# Patient Record
Sex: MALE | Race: WHITE | NOT HISPANIC OR LATINO | Employment: OTHER | URBAN - METROPOLITAN AREA
[De-identification: names, ages, dates, MRNs, and addresses within clinical notes are randomized per-mention and may not be internally consistent; named-entity substitution may affect disease eponyms.]

---

## 2017-04-26 ASSESSMENT — ENCOUNTER SYMPTOMS
SINUS PAIN: 1
SORE THROAT: 1
TROUBLE SWALLOWING: 0
SINUS CONGESTION: 1
NECK MASS: 0
SMELL DISTURBANCE: 0
HOARSE VOICE: 0
TASTE DISTURBANCE: 0

## 2017-05-05 ENCOUNTER — PRE VISIT (OUTPATIENT)
Dept: ENDOCRINOLOGY | Facility: CLINIC | Age: 37
End: 2017-05-05

## 2017-05-05 NOTE — TELEPHONE ENCOUNTER
Chief Complaint   Patient presents with     Pre Visit Planning - Unable To Reach     Not able to reach patient by phone, sent [employer] message in hopes to obtain medical records prior to appt. Based on phone number of patient, he [place].

## 2017-05-08 ENCOUNTER — PRE VISIT (OUTPATIENT)
Dept: ENDOCRINOLOGY | Facility: CLINIC | Age: 37
End: 2017-05-08

## 2017-05-08 NOTE — TELEPHONE ENCOUNTER
Patient confirmed via [employer] that medical records are from [facility] in [place] and they have been opened [facility]

## 2017-05-10 ENCOUNTER — OFFICE VISIT (OUTPATIENT)
Dept: ENDOCRINOLOGY | Facility: CLINIC | Age: 37
End: 2017-05-10

## 2017-05-10 VITALS
WEIGHT: 207.5 LBS | HEIGHT: 74 IN | HEART RATE: 80 BPM | BODY MASS INDEX: 26.63 KG/M2 | SYSTOLIC BLOOD PRESSURE: 140 MMHG | DIASTOLIC BLOOD PRESSURE: 84 MMHG

## 2017-05-10 DIAGNOSIS — Z91.89 COMPLIANCE WITH MEDICATION REGIMEN: ICD-10-CM

## 2017-05-10 DIAGNOSIS — E10.9 TYPE 1 DIABETES MELLITUS WITHOUT COMPLICATION (H): Primary | ICD-10-CM

## 2017-05-10 LAB — HBA1C MFR BLD: 7.9 % (ref 4.3–6)

## 2017-05-10 RX ORDER — LANCETS 30 GAUGE
EACH MISCELLANEOUS
COMMUNITY
Start: 2017-03-06

## 2017-05-10 RX ORDER — ATORVASTATIN CALCIUM 10 MG/1
10 TABLET, FILM COATED ORAL DAILY
COMMUNITY
Start: 2017-03-08 | End: 2018-03-20

## 2017-05-10 RX ORDER — GLUCOSAMINE HCL/CHONDROITIN SU 500-400 MG
CAPSULE ORAL
COMMUNITY
Start: 2017-03-22

## 2017-05-10 ASSESSMENT — PAIN SCALES - GENERAL: PAINLEVEL: NO PAIN (0)

## 2017-05-10 NOTE — LETTER
5/10/2017       RE: Kyle Bermeo  [address] [place] [address]     Dear Colleague,    Thank you for referring your patient, Kyle Bermeo, to the [facility] ENDOCRINOLOGY at [facility]. Please see a copy of my visit note below.                             - Endocrinology Initial Consultation -    Reason for visit/consult:  DM1 on insulin pump    Primary care provider: Confirmed, No PCP    HPI: 36 yo male here for the initial visit for his DM1. He moved from [place] to [place] 10/2016, now starts to establish care in [place]. He was seen at [facility] [place] [facility], Dr. Mario, last seen in 9/2016.  He has DM1 since age 8, at that time he had polyuria, glucose was 750 during the baseball game.   Insulin pump ([employer]tron[employer]) was started 15 years ago.    His A1C is 7.9 today, he mentioned used to be usually A1C around 7, but recently was elevated to scar up to 7.0s -8.0s..     His concern is about insulin injection sites. He states that he had lots scar around his abdomen, and he noted that tube needle above the scar affects his glucose control. For example, above the scar, it takes 3 hours for insulin start to work, therefore sometimes he had to bolus a few times in a row, which eventually drop his glucose.   For the past several month, he tried back, buttock, and thigh. He said thigh did not work well.   Insulin pump setting has not changed recently. Length of needl 9 mm, no recent episode of tube occulusion.    Hypoglycemia: He noted the patterns due to over correction.    Currently also on CGM, not checking glucose by glucometer except calibration.     Current regimnes:   Carb ratio: 1:8 (all meals)  Sentivity 30  Active insulin tme: 4 hour    Basal  0:00    1.85  3:00    1.55  8:00    2.00  14:00  1.75  19:00  2.00  22:30  1.80    Life style:  Wake up 6am  Breakfast: skip, coffee only  Lunch: sandwich (70-90g)  No much snacks  Dinner: (70-90g carb)      DM  "complications:  Retinopathy: 7/2016 early retinopathy  Nephropathy: NEGATIVE (8/2016)  Neuropathy: no  Most recent LDL: June/2016 59 (care everywhere) (on lipitor)    Past Medical/Surgical History:  Past Medical History:   Diagnosis Date     Diabetes mellitus type 1 (H)     since age 8, medtronics pump,      No past surgical history on file.    Allergies:  No Known Allergies    Current Medications   Current Outpatient Prescriptions   Medication     insulin aspart (NOVOLOG PEN) 100 UNIT/ML injection     atorvastatin (LIPITOR) 10 MG tablet     insulin aspart (NOVOLOG VIAL) 100 UNITS/ML injection     Glucose Blood (BLOOD GLUCOSE TEST STRIPS) STRP     Lancets MISC     Multiple Vitamins-Minerals (MULTIVITAMIN ADULT PO)     VITAMIN D, CHOLECALCIFEROL, PO     No current facility-administered medications for this visit.        Family History:  No family history on file.  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      ROS:  Full review of systems taken with the help of the intake sheet. Otherwise a complete 14 point review of systems was taken and is negative unless stated in the history above.      Physical Exam:   Blood pressure 140/84, pulse 80, height 1.88 m (6' 2\"), weight 94.1 kg (207 lb 8 oz).  General: well appearing, no acute distress, pleasant and conversant,   Mental Status/neuro: alert and oriented  Face: symmetrical, normal facial color  Eyes: anicteric, PERRL, no proptosis or lid lag  Neck: suppler, no lymphadenopahty  Thyroid: normal size and texture, no nodule palpable, no bruits  Heart: regular rhythm, S1S2, no murmur appreciated  Lung: clear to auscultation bilaterally  Abdomen: soft, NT/ND, hard 5-6 cm multiple tissues palpable by deep palpation, around " umbilicus to the side  Back: no palpable scar tissues  Legs: no swelling or edema  Feet: no deformities or ulcers, 2+ DP pulses, normal monofilament sensation      Labs : I accessed care everywhere and reviewed out side record. Summarizing most recent lab here.     TSH 0.86 (8/2016)  (8/2016) Na 138, K 4.3, Ca 9.8, BUN 61, Cre 1.04, TP 7.3, AST 33, ALT 32, ALP 59,       Glucose Log: We downloaded from pump program and summarize pump use here:    Pump use 62.7 unit ave /day  Basal/Bolus ratio 67/33  Manual bolus 18 u (5.6 bolus)  Bolus wizard 2.9 u (0.8 bolus)  Food 0 units  Site change 4.7 days ave        Assessment and Plan  37 year old male with DM1 on insulin pump,  # Ann Marie's input appreciated, we noted following,   1. basal/bolus balance is ideal,   2. not doing carb ration based bolus, instead multiple manual bolus,   3. abdominal scar tisseus    -Refer to Ann Marie and Mahnaz in 1 month  - Conitnue current basal,  - Encouraged to do 1:8 meal bolus, that he is supposed to do  - Insulin pump injection site OK to continue on his back  - RTC with me in 3 month      I spent 45 minutes with this patient face to face and explained the conditions and plans (more than 50% of time was counseling/coordination of care, discussed about injection sites, discussed about bolus) . The patient understood and is satisfied with today's visit. Return to clinic with me in 3 months.         Lilia Mendoza MD  Staff Physician  Endocrinology and Metabolism  License: [id number]    Again, thank you for allowing me to participate in the care of your patient.      Sincerely,    Lilia Mendoza MD

## 2017-05-10 NOTE — PROGRESS NOTES
- Endocrinology Initial Consultation -    Reason for visit/consult:  DM1 on insulin pump    Primary care provider: Confirmed, No PCP    HPI: 36 yo male here for the initial visit for his DM1. He moved from [place] to [place] 10/2016, now starts to establish care in [place]. He was seen at [facility], Dr. Mario, last seen in 9/2016.  He has DM1 since age 8, at that time he had polyuria, glucose was 750 during the baseball game.   Insulin pump ([employer]tron[employer]) was started 15 years ago.    His A1C is 7.9 today, he mentioned used to be usually A1C around 7, but recently was elevated to scar up to 7.0s -8.0s..     His concern is about insulin injection sites. He states that he had lots scar around his abdomen, and he noted that tube needle above the scar affects his glucose control. For example, above the scar, it takes 3 hours for insulin start to work, therefore sometimes he had to bolus a few times in a row, which eventually drop his glucose.   For the past several month, he tried back, buttock, and thigh. He said thigh did not work well.   Insulin pump setting has not changed recently. Length of needl 9 mm, no recent episode of tube occulusion.    Hypoglycemia: He noted the patterns due to over correction.    Currently also on CGM, not checking glucose by glucometer except calibration.     Current regimnes:   Carb ratio: 1:8 (all meals)  Sentivity 30  Active insulin tme: 4 hour    Basal  0:00    1.85  3:00    1.55  8:00    2.00  14:00  1.75  19:00  2.00  22:30  1.80    Life style:  Wake up 6am  Breakfast: skip, coffee only  Lunch: sandwich (70-90g)  No much snacks  Dinner: (70-90g carb)      DM complications:  Retinopathy: 7/2016 early retinopathy  Nephropathy: NEGATIVE (8/2016)  Neuropathy: no  Most recent LDL: June/2016 59 (care everywhere) (on lipitor)    Past Medical/Surgical History:  Past Medical History:   Diagnosis Date     Diabetes mellitus type 1 (H)     since age 8,  "medtron[employer] pump,      No past surgical history on file.    Allergies:  No Known Allergies    Current Medications   Current Outpatient Prescriptions   Medication     insulin aspart (NOVOLOG PEN) 100 UNIT/ML injection     atorvastatin (LIPITOR) 10 MG tablet     insulin aspart (NOVOLOG VIAL) 100 UNITS/ML injection     Glucose Blood (BLOOD GLUCOSE TEST STRIPS) STRP     Lancets MISC     Multiple Vitamins-Minerals (MULTIVITAMIN ADULT PO)     VITAMIN D, CHOLECALCIFEROL, PO     No current facility-administered medications for this visit.        Family History:  No family history on file.  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      ROS:  Full review of systems taken with the help of the intake sheet. Otherwise a complete 14 point review of systems was taken and is negative unless stated in the history above.      Physical Exam:   Blood pressure 140/84, pulse 80, height 1.88 m (6' 2\"), weight 94.1 kg (207 lb 8 oz).  General: well appearing, no acute distress, pleasant and conversant,   Mental Status/neuro: alert and oriented  Face: symmetrical, normal facial color  Eyes: anicteric, PERRL, no proptosis or lid lag  Neck: suppler, no lymphadenopahty  Thyroid: normal size and texture, no nodule palpable, no bruits  Heart: regular rhythm, S1S2, no murmur appreciated  Lung: clear to auscultation bilaterally  Abdomen: soft, NT/ND, hard 5-6 cm multiple tissues palpable by deep palpation, around umbilicus to the side  Back: no palpable scar tissues  Legs: no swelling or edema  Feet: no deformities or ulcers, 2+ DP pulses, normal monofilament sensation      Labs : I accessed care everywhere and reviewed out side record. Summarizing most recent lab here.     TSH 0.86 (8/2016)  (8/2016) " Na 138, K 4.3, Ca 9.8, BUN 61, Cre 1.04, TP 7.3, AST 33, ALT 32, ALP 59,       Glucose Log: We downloaded from pump program and summarize pump use here:    Pump use 62.7 unit ave /day  Basal/Bolus ratio 67/33  Manual bolus 18 u (5.6 bolus)  Bolus wizard 2.9 u (0.8 bolus)  Food 0 units  Site change 4.7 days ave        Assessment and Plan  37 year old male with DM1 on insulin pump,  # Ann Marie's input appreciated, we noted following,   1. basal/bolus balance is ideal,   2. not doing carb ration based bolus, instead multiple manual bolus,   3. abdominal scar tisseus    -Refer to Ann Marie and Mahnaz in 1 month  - Conitnue current basal,  - Encouraged to do 1:8 meal bolus, that he is supposed to do  - Insulin pump injection site OK to continue on his back  - RTC with me in 3 month      I spent 45 minutes with this patient face to face and explained the conditions and plans (more than 50% of time was counseling/coordination of care, discussed about injection sites, discussed about bolus) . The patient understood and is satisfied with today's visit. Return to clinic with me in 3 months.         Lilia Mendoza MD  Staff Physician  Endocrinology and Metabolism  License: [id number]

## 2017-05-10 NOTE — NURSING NOTE
"Performed A1C test - patient tolerated well.    Jacque Liang, SABIRNA       Chief Complaint   Patient presents with     Consult     DIABETES TYPE 1 CONSULTATION        Initial /84 (BP Location: Right arm, Patient Position: Chair, Cuff Size: Adult Regular)  Pulse 80  Ht 1.88 m (6' 2\")  Wt 94.1 kg (207 lb 8 oz)  BMI 26.64 kg/m2 Estimated body mass index is 26.64 kg/(m^2) as calculated from the following:    Height as of this encounter: 1.88 m (6' 2\").    Weight as of this encounter: 94.1 kg (207 lb 8 oz).  Medication Reconciliation: complete    "

## 2017-05-10 NOTE — LETTER
Date:May 11, 2017      Patient was self referred, no letter generated. Do not send.        [facility] Health Information

## 2017-05-10 NOTE — MR AVS SNAPSHOT
After Visit Summary   5/10/2017    Kyle Bermeo    MRN: [id number]           Patient Information     Date Of Birth          1980        Visit Information        Provider Department      5/10/2017 9:30 AM Lilia Mendoza MD  [facility]        Today's Diagnoses     Type 1 diabetes mellitus without complication (H)    -  1       Follow-ups after your visit        Additional Services     DIABETES EDUCATOR REFERRAL       Please refer to Annmarie    38 yo male with DM1, insulin pupm                  Your next 10 appointments already scheduled     Jul 26, 2017  8:30 AM CDT   (Arrive by 8:15 AM)   Office Visit with Mahnaz Rowland RD   [facility] Diabetes ([facility])    [address] [place] [id number] [phone]           Bring a current list of meds and any records pertaining to this visit.  For Physicals, please bring immunization records and any forms needing to be filled out.  Please arrive 10 minutes early to complete paperwork.            Jul 26, 2017  9:30 AM CDT   (Arrive by 9:15 AM)   Office Visit with Ann Marie Godoy RN   [facility] ([facility])    [address] [place] [address] [phone]           Bring a current list of meds and any records pertaining to this visit.  For Physicals, please bring immunization records and any forms needing to be filled out.  Please arrive 10 minutes early to complete paperwork.            Aug 22, 2017  3:30 PM CDT   (Arrive by 3:15 PM)   RETURN DIABETES with Lilia Mendoza MD   [facility] Endocrinology ([facility])    [address] [place] [address] [phone]              Who to contact     Please call your clinic at [phone] to:    Ask questions about your health    Make or cancel appointments    Discuss your medicines    Learn about your test results    Speak to your  "doctor   If you have compliments or concerns about an experience at your clinic, or if you wish to file a complaint, please contact [facility] Patient Relations at [phone] or email us at [email or web address]         Additional Information About Your Visit        MyChart Information     [employer] gives you secure access to your electronic health record. If you see a primary care provider, you can also send messages to your care team and make appointments. If you have questions, please call your primary care clinic.  If you do not have a primary care provider, please call [phone] and they will assist you.      [employer] is an electronic gateway that provides easy, online access to your medical records. With [employer], you can request a clinic appointment, read your test results, renew a prescription or communicate with your care team.     To access your existing account, please contact your [facility] or call [phone] for assistance.        Care EveryWhere ID     This is your Care EveryWhere ID. This could be used by other organizations to access your [facility] medical records  [id number]        Your Vitals Were     Pulse Height BMI (Body Mass Index)             80 1.88 m (6' 2\") 26.64 kg/m2          Blood Pressure from Last 3 Encounters:   05/10/17 140/84    Weight from Last 3 Encounters:   05/10/17 94.1 kg (207 lb 8 oz)              We Performed the Following     DIABETES EDUCATOR REFERRAL        Primary Care Provider    No Pcp Confirmed       No address on file        Thank you!     Thank you for choosing [facility] ENDOCRINOLOGY  for your care. Our goal is always to provide you with excellent care. Hearing back from our patients is one way we can continue to improve our services. Please take a few minutes to complete the written survey that you may receive in the mail after your visit with us. Thank you!             Your Updated Medication " List - Protect others around you: Learn how to safely use, store and throw away your medicines at www.disposemymeds.org.          This list is accurate as of: 5/10/17 10:59 AM.  Always use your most recent med list.                   Brand Name Dispense Instructions for use    atorvastatin 10 MG tablet    LIPITOR     10 mg daily       BLOOD GLUCOSE TEST STRIPS Strp      10 times daily       * insulin aspart 100 UNIT/ML injection    NovoLOG PEN         * NovoLOG VIAL 100 UNITS/ML injection   Generic drug:  insulin aspart      100 Units       Lancets Misc      checks 10 times daily       MULTIVITAMIN ADULT PO          VITAMIN D (CHOLECALCIFEROL) PO      Take by mouth as needed TAKES IN WINTER MONTHS       * Notice:  This list has 2 medication(s) that are the same as other medications prescribed for you. Read the directions carefully, and ask your doctor or other care provider to review them with you.

## 2017-10-11 ENCOUNTER — OFFICE VISIT (OUTPATIENT)
Dept: EDUCATION SERVICES | Facility: CLINIC | Age: 37
End: 2017-10-11

## 2017-10-11 DIAGNOSIS — E10.9 DIABETES MELLITUS TYPE 1 (H): Primary | ICD-10-CM

## 2017-10-11 NOTE — MR AVS SNAPSHOT
After Visit Summary   10/11/2017    Kyle Bermeo    MRN: [id number]           Patient Information     Date Of Birth          1980        Visit Information        Provider Department      10/11/2017 10:30 AM Mahnaz Rowland RD M [facility] Diabetes        Today's Diagnoses     Diabetes mellitus type 1 (H)    -  1       Follow-ups after your visit        Your next 10 appointments already scheduled     Nov 06, 2017  8:00 AM CST   (Arrive by 7:45 AM)   RETURN DIABETES with MD YASMIN Feliz [facility] Endocrinology ([facility])    [address] [place] [address] [phone]              Who to contact     Please call your clinic at [phone] to:    Ask questions about your health    Make or cancel appointments    Discuss your medicines    Learn about your test results    Speak to your doctor   If you have compliments or concerns about an experience at your clinic, or if you wish to file a complaint, please contact [facility] Patient Relations at [phone] or email us at [email or web address]         Additional Information About Your Visit        MyChart Information     [employer] gives you secure access to your electronic health record. If you see a primary care provider, you can also send messages to your care team and make appointments. If you have questions, please call your primary care clinic.  If you do not have a primary care provider, please call [phone] and they will assist you.      [employer] is an electronic gateway that provides easy, online access to your medical records. With [employer], you can request a clinic appointment, read your test results, renew a prescription or communicate with your care team.     To access your existing account, please contact your [facility] or call [phone] for assistance.        Care EveryWhere ID     This is your Care EveryWhere  ID. This could be used by other organizations to access your [facility] medical records  [id number]         Blood Pressure from Last 3 Encounters:   05/10/17 140/84    Weight from Last 3 Encounters:   05/10/17 94.1 kg (207 lb 8 oz)              We Performed the Following     DIABETES EDUCATION - Individual  []        Primary Care Provider    None Specified       No primary provider on file.        Equal Access to Services     [facility]: Hadii aad ku hadasho Soomaali, waaxda luqadaha, qaybta kaalmada adeegyada, waxay alannahin hayaan adeeg taylor labriann . So [facility] [phone].    ATENCIÓN: Si habla español, tiene a conroy disposición servicios gratuitos de asistencia lingüística. Llame al [phone].    We comply with applicable federal civil rights laws and [place] laws. We do not discriminate on the basis of race, color, national origin, age, disability, sex, sexual orientation, or gender identity.            Thank you!     Thank you for choosing [facility] DIABETES  for your care. Our goal is always to provide you with excellent care. Hearing back from our patients is one way we can continue to improve our services. Please take a few minutes to complete the written survey that you may receive in the mail after your visit with us. Thank you!             Your Updated Medication List - Protect others around you: Learn how to safely use, store and throw away your medicines at www.disposemymeds.org.          This list is accurate as of: 10/11/17 11:58 AM.  Always use your most recent med list.                   Brand Name Dispense Instructions for use Diagnosis    atorvastatin 10 MG tablet    LIPITOR     10 mg daily    Type 1 diabetes mellitus without complication (H)       BLOOD GLUCOSE TEST STRIPS Strp      10 times daily    Type 1 diabetes mellitus without complication (H)       * insulin aspart 100 UNIT/ML injection    NovoLOG PEN      Type 1 diabetes mellitus without complication (H)       * NovoLOG VIAL 100  UNITS/ML injection   Generic drug:  insulin aspart      100 Units    Type 1 diabetes mellitus without complication (H)       Lancets Misc      checks 10 times daily    Type 1 diabetes mellitus without complication (H)       MULTIVITAMIN ADULT PO       Type 1 diabetes mellitus without complication (H)       VITAMIN D (CHOLECALCIFEROL) PO      Take by mouth as needed TAKES IN WINTER MONTHS    Type 1 diabetes mellitus without complication (H)       * Notice:  This list has 2 medication(s) that are the same as other medications prescribed for you. Read the directions carefully, and ask your doctor or other care provider to review them with you.

## 2017-10-11 NOTE — MR AVS SNAPSHOT
After Visit Summary   10/11/2017    Kyle Bermeo    MRN: [id number]           Patient Information     Date Of Birth          1980        Visit Information        Provider Department      10/11/2017 9:30 AM Ann Marie Godoy RN M [facility] Diabetes        Today's Diagnoses     Diabetes mellitus type 1 (H)    -  1       Follow-ups after your visit        Your next 10 appointments already scheduled     Nov 06, 2017  8:00 AM CST   (Arrive by 7:45 AM)   RETURN DIABETES with MD YASMIN Feliz [facility] Endocrinology ([facility])    [address] [place] [id number] [phone]              Who to contact     Please call your clinic at [phone] to:    Ask questions about your health    Make or cancel appointments    Discuss your medicines    Learn about your test results    Speak to your doctor   If you have compliments or concerns about an experience at your clinic, or if you wish to file a complaint, please contact [facility] Patient Relations at [phone] or email us at [email or web address]         Additional Information About Your Visit        MyChart Information     [employer] gives you secure access to your electronic health record. If you see a primary care provider, you can also send messages to your care team and make appointments. If you have questions, please call your primary care clinic.  If you do not have a primary care provider, please call [phone] and they will assist you.      [employer] Colourjack is an electronic gateway that provides easy, online access to your medical records. With [employer], you can request a clinic appointment, read your test results, renew a prescription or communicate with your care team.     To access your existing account, please contact your [facility] or call [phone] for assistance.        Care EveryWhere ID     This is your Care EveryWhere  ID. This could be used by other organizations to access your [place] medical records  [id number]         Blood Pressure from Last 3 Encounters:   05/10/17 140/84    Weight from Last 3 Encounters:   05/10/17 94.1 kg (207 lb 8 oz)              Today, you had the following     No orders found for display       Primary Care Provider    None Specified       No primary provider on file.        Equal Access to Services     [facility]: Hadii aad ku hadasho Soomaali, waaxda luqadaha, qaybta kaalmada adeegyada, waxay alannahin hayaan adelopez sarabenji lei . So [facility] [phone].    ATENCIÓN: Si habla español, tiene a conroy disposición servicios gratuitos de asistencia lingüística. Llame al [phone].    We comply with applicable federal civil rights laws and [place] laws. We do not discriminate on the basis of race, color, national origin, age, disability, sex, sexual orientation, or gender identity.            Thank you!     Thank you for choosing [facility] DIABETES  for your care. Our goal is always to provide you with excellent care. Hearing back from our patients is one way we can continue to improve our services. Please take a few minutes to complete the written survey that you may receive in the mail after your visit with us. Thank you!             Your Updated Medication List - Protect others around you: Learn how to safely use, store and throw away your medicines at www.disposemymeds.org.          This list is accurate as of: 10/11/17 11:59 PM.  Always use your most recent med list.                   Brand Name Dispense Instructions for use Diagnosis    atorvastatin 10 MG tablet    LIPITOR     10 mg daily    Type 1 diabetes mellitus without complication (H)       BLOOD GLUCOSE TEST STRIPS Strp      10 times daily    Type 1 diabetes mellitus without complication (H)       * insulin aspart 100 UNIT/ML injection    NovoLOG PEN      Type 1 diabetes mellitus without complication (H)       * NovoLOG VIAL 100 UNITS/ML  injection   Generic drug:  insulin aspart      100 Units    Type 1 diabetes mellitus without complication (H)       Lancets Misc      checks 10 times daily    Type 1 diabetes mellitus without complication (H)       MULTIVITAMIN ADULT PO       Type 1 diabetes mellitus without complication (H)       VITAMIN D (CHOLECALCIFEROL) PO      Take by mouth as needed TAKES IN WINTER MONTHS    Type 1 diabetes mellitus without complication (H)       * Notice:  This list has 2 medication(s) that are the same as other medications prescribed for you. Read the directions carefully, and ask your doctor or other care provider to review them with you.

## 2017-10-11 NOTE — PROGRESS NOTES
"  Diabetes Self Management Training: Individual Review Visit    Kyle Bermeo presents today for education related to Type 1 diabetes.    He is accompanied by self    Patient Problem List and Family Medical History reviewed for relevant medical history, current medical status, and diabetes risk factors.    Current Diabetes Management per Patient:  Taking diabetes medications?   yes:     Diabetes Medication(s)     Insulin Sig    insulin aspart (NOVOLOG PEN) 100 UNIT/ML injection     insulin aspart (NOVOLOG VIAL) 100 UNITS/ML injection 100 Units          Past Diabetes Education: Yes    Patient glucose self monitoring as follows: All bg results reviewed by Ann Marie OBRIEN today, see her note for summary.       Vitals:  There were no vitals taken for this visit.  Estimated body mass index is 26.64 kg/(m^2) as calculated from the following:    Height as of 5/10/17: 1.88 m (6' 2\").    Weight as of 5/10/17: 94.1 kg (207 lb 8 oz).   Last 3 BP:   BP Readings from Last 3 Encounters:   05/10/17 140/84     History   Smoking Status     Not on file   Smokeless Tobacco     Not on file       Labs:  No results found for: A1C  No results found for: GLC  No results found for: LDL  No results found for: HDL]  No results found for: GFRESTIMATED  No results found for: GFRESTBLACK  No results found for: CR  No results found for: MICROALBUMIN    Nutrition Review:  Kyle is here today per Dr. Mendoza for type 1 diabetes/nutrition/carb counting review. He was dx with his diabetes at age 9. He wears a MEdtronic 630G insulin pump which was downloaded today with Ann Marie OBRIEN. He is  and has a 3 yo son. He walks for exercise. He tells me he has not measured his food for along time, he estimates a lot based on carb exchanges and sometimes reads nutrition labels.     Diet Recall:   Breakfast:coffee/cream. Weekend: sometimes eggs/toast  AM snack:none  Lunch:ham/cheese sandwich, chips, apple, veg, yogurt, diet coke or water or out: " [employer] or [employer]  PM snack:popcorn or chips or candy corn recently  Dinner:mac and cheese or lasagna or [place]araguan foods/rice or Mexican or curries, water  Evening snack:sometimes popcorn    Eats out in restaurants: about 2 times per week  Beverages: water, diet soda  ETOH: 1 beer or wine every other day       Gave Kyle written and verbal information on general healthy eating, low sat/trans fat & carbohydrate counting. Reviewed how to access nutrition information/carbohydrates when eating out in restaurants using phone apps and other web sites. Encouraged Kyle to measure his foods again for awhile and/or purchase a nutrition scale from [employer] that will tell him the carbs in his foods based on the weight for convenience. Showed him how to look up foods at [employer] for example and encouraged him to use his Smart phone to look up foods he usually eats. Discussed safe use of alcohol            Education provided today on:  AADE Self-Care Behaviors:  Healthy Eating: carbohydrate counting, heart healthy diet, eating out and label reading    Pt verbalized understanding of concepts discussed and recommendations provided today.       Education Materials Provided:  Carbohydrate Counting    ASSESSMENT: Kyle is not counting his carbohydrates accurately much of the time, and appears to be underestimating as he has not measured or has looked up foods online when eating in chain restaurants. Verbalized correct carbs today, and his intent to be more careful when carb counting.       PLAN:  Healthy diet review  Carb counting review  Use phone/internet/scale for better accuracy  AVS printed and provided to patient today.    FOLLOW-UP:  Follow-up as needed.   Chart routed to referring provider.    Time Spent: 60 minutes  Encounter Type: Individual    Any diabetes medication dose changes were made via the CDE Protocol and Collaborative Practice Agreement with the patient's referring provider. A copy of this encounter was shared  with the provider.

## 2017-10-12 NOTE — PROGRESS NOTES
DIABETES EDUCATION NOTE:    Kyle Bermeo presents today for education related to Type 1 diabetes    Patient is being treated with:  insulin pump  He is accompanied by self    PATIENT CONCERNS RELATED TO DIABETES SELF MANAGEMENT:     Has been wearing an insulin pump for a number of years, and also wears the [employer] sensor/CGM with his Medtronic 530G pump.    His perception of the problem he is having right now is that he isn't absorbing well from his pump sites.     ASSESSMENT:  Insulin Pump Management:    Uses bolus calculator:  States that he typically hasn't in the past but has been using since his latest visit with endocrinology.  States that he feels that his carb counting is accurate.  Review of his pump download indicates that although he is entering carbohydrates into his pump, he is manually entering all of his insulin doses.  He is apparently using his sensor readings to guide his bolusing as there are frequent boluses entered without any relation to BG readings or carb entries.      He is changing out his infusion set on average every 4 to 5 days.  Currently using a Quick set 9mm catheter.  Although he feels that his absorption is the issue, overnight his blood glucoses for the most part appear to be in good control, which would not point to an absorption problem.  If absorption was the issue, we would expect that his glucose would be high across the 24 hour period.  Really where his glucose control appears to be out of control is later in the evening.  According to his pump download he is entering between 45 and  60 grams CHO for dinner, and then giving himself correction boluses after this. He has an appointment with dietitian today to review carb counting.      His pump is suspended almost every morning between 6am and 8am, and he states that he suspends the pump for his shower, then forgets to resume it.      He has 6 basal rates set in the pump:  12:00am  1.85  03:00am  1.55  08:00am  2.00  02:00pm   1.75  07:00pm  2.00  11:30pm  1.80    Monitoring      Hypoglycemia:   Patient is at risk of hypoglycemia? Yes,  He currently has his sensor turned on, but has no high or low limits set, and does not have threshold suspend turned on.                           EDUCATION and INSTRUCTION PROVIDED AT THIS VISIT:    Reviewed some of the basics of pump management.  Mahnaz Rowland will evaluate his carb counting.  Suggested some alternative sites for infusion sets, and strongly recommended that he get in the habit of only filling his reservoir for a 3 day supply and changing out his infusion set every 3 days. Reviewed utilization of the bolus calculator, and allowing the pump to make recommendations.  Explained that adjusting his pump settings becomes difficult if he is not using the bolus calculator.  Reinforced not suspending the pump for showers, etc. To avoid prolonged periods of suspension.      He had some questions about the HCL Medtronic system, and discussed some of the differences between his current pump/sensor combo and the newest one.  He states that he will consider this.  Suggested a follow up visit either in person or by phone with a pump upload from home.  He was not willing to schedule today.  Said he will consider this.           Patient-stated goal written and given to Kyle Bermeo.  Verbalized and demonstrated understanding of instructions.     PLAN:  See patient instructions  AVS printed and given to patient    FOLLOW-UP:        Time spent with patient at today's visit was 60 minutes.      Any diabetes medication dose changes were made via the CDE Protocol and Collaborative Practice Agreement with Palmer and  Sonja.  A copy of this encounter was provided to patient's referring provider.

## 2017-10-23 ASSESSMENT — ENCOUNTER SYMPTOMS
SINUS PAIN: 0
SINUS CONGESTION: 1
TROUBLE SWALLOWING: 0
TASTE DISTURBANCE: 0
SORE THROAT: 0
SMELL DISTURBANCE: 0
NECK MASS: 0
HOARSE VOICE: 0

## 2017-11-03 DIAGNOSIS — E10.9 DIABETES MELLITUS TYPE 1 (H): Primary | ICD-10-CM

## 2017-11-03 DIAGNOSIS — E10.9 DIABETES MELLITUS TYPE 1 (H): ICD-10-CM

## 2017-11-03 LAB
CHOLEST SERPL-MCNC: 125 MG/DL
CREAT SERPL-MCNC: 0.92 MG/DL (ref 0.66–1.25)
CREAT UR-MCNC: 17 MG/DL
GFR SERPL CREATININE-BSD FRML MDRD: >90 ML/MIN/1.7M2
HBA1C MFR BLD: 7.5 % (ref 4.3–6)
HDLC SERPL-MCNC: 52 MG/DL
LDLC SERPL CALC-MCNC: 60 MG/DL
MICROALBUMIN UR-MCNC: <5 MG/L
MICROALBUMIN/CREAT UR: NORMAL MG/G CR (ref 0–17)
NONHDLC SERPL-MCNC: 73 MG/DL
TRIGL SERPL-MCNC: 65 MG/DL
TSH SERPL DL<=0.005 MIU/L-ACNC: 0.93 MU/L (ref 0.4–4)

## 2017-11-06 ENCOUNTER — OFFICE VISIT (OUTPATIENT)
Dept: ENDOCRINOLOGY | Facility: CLINIC | Age: 37
End: 2017-11-06

## 2017-11-06 VITALS — BODY MASS INDEX: 26.95 KG/M2 | HEIGHT: 74 IN | WEIGHT: 210 LBS

## 2017-11-06 DIAGNOSIS — E10.9 TYPE 1 DIABETES MELLITUS WITHOUT COMPLICATION (H): Primary | ICD-10-CM

## 2017-11-06 DIAGNOSIS — Z46.81 INSULIN PUMP TITRATION: ICD-10-CM

## 2017-11-06 ASSESSMENT — PAIN SCALES - GENERAL: PAINLEVEL: NO PAIN (0)

## 2017-11-06 NOTE — MR AVS SNAPSHOT
After Visit Summary   11/6/2017    Kyle Bermeo    MRN: [id number]           Patient Information     Date Of Birth          1980        Visit Information        Provider Department      11/6/2017 8:00 AM Lilia Mendoza MD [facility] Endocrinology        Today's Diagnoses     Type 1 diabetes mellitus without complication (H)    -  1    Insulin pump titration           Follow-ups after your visit        Follow-up notes from your care team     Return in about 6 months (around 5/6/2018).      Who to contact     Please call your clinic at [phone] to:    Ask questions about your health    Make or cancel appointments    Discuss your medicines    Learn about your test results    Speak to your doctor   If you have compliments or concerns about an experience at your clinic, or if you wish to file a complaint, please contact [facility] Patient Relations at [phone] or email us at [email or web address]         Additional Information About Your Visit        MyChart Information     [employer] gives you secure access to your electronic health record. If you see a primary care provider, you can also send messages to your care team and make appointments. If you have questions, please call your primary care clinic.  If you do not have a primary care provider, please call [phone] and they will assist you.      [employer] is an electronic gateway that provides easy, online access to your medical records. With [employer], you can request a clinic appointment, read your test results, renew a prescription or communicate with your care team.     To access your existing account, please contact your [facility] or call [phone] for assistance.        Care EveryWhere ID     This is your Care EveryWhere ID. This could be used by other organizations to access your [place] medical records  [id number]        Your Vitals Were     Height BMI (Body Mass  "Index)                1.88 m (6' 2\") 26.96 kg/m2           Blood Pressure from Last 3 Encounters:   11/06/17 (P) 137/70   05/10/17 140/84    Weight from Last 3 Encounters:   11/06/17 95.3 kg (210 lb)   05/10/17 94.1 kg (207 lb 8 oz)              Today, you had the following     No orders found for display         Today's Medication Changes          These changes are accurate as of: 11/6/17 11:59 PM.  If you have any questions, ask your nurse or doctor.               These medicines have changed or have updated prescriptions.        Dose/Directions    insulin aspart 100 UNIT/ML injection   Commonly known as:  NovoLOG PEN   This may have changed:  Another medication with the same name was removed. Continue taking this medication, and follow the directions you see here.   Used for:  Type 1 diabetes mellitus without complication (H)   Changed by:  Lilia Mendoza MD        Refills:  0                Primary Care Provider    None Specified       No primary provider on file.        Equal Access to Services     [facility] : Zainab Calderón, belén quigley, cesar yan, kaye lei . So [facility] [phone].    ATENCIÓN: Si habla español, tiene a conroy disposición servicios gratuitos de asistencia lingüística. Llame al [phone].    We comply with applicable federal civil rights laws and [place] laws. We do not discriminate on the basis of race, color, national origin, age, disability, sex, sexual orientation, or gender identity.            Thank you!     Thank you for choosing [facility] ENDOCRINOLOGY  for your care. Our goal is always to provide you with excellent care. Hearing back from our patients is one way we can continue to improve our services. Please take a few minutes to complete the written survey that you may receive in the mail after your visit with us. Thank you!             Your Updated Medication List - Protect others around you: Learn how to safely " use, store and throw away your medicines at www.disposemymeds.org.          This list is accurate as of: 11/6/17 11:59 PM.  Always use your most recent med list.                   Brand Name Dispense Instructions for use Diagnosis    atorvastatin 10 MG tablet    LIPITOR     10 mg daily    Type 1 diabetes mellitus without complication (H)       BLOOD GLUCOSE TEST STRIPS Strp      10 times daily    Type 1 diabetes mellitus without complication (H)       humaLOG 100 UNIT/ML injection   Generic drug:  insulin lispro           insulin aspart 100 UNIT/ML injection    NovoLOG PEN      Type 1 diabetes mellitus without complication (H)       Lancets Misc      checks 10 times daily    Type 1 diabetes mellitus without complication (H)       MULTIVITAMIN ADULT PO       Type 1 diabetes mellitus without complication (H)       VITAMIN D (CHOLECALCIFEROL) PO      Take by mouth as needed TAKES IN WINTER MONTHS    Type 1 diabetes mellitus without complication (H)

## 2017-11-06 NOTE — NURSING NOTE
Chief Complaint   Patient presents with     RECHECK     F/U TYPE I DM     Mell Cast, [facility]  Endocrinology & Diabetes 3G

## 2017-11-06 NOTE — LETTER
11/6/2017       RE: Kyle Bermeo  [address] [place] [address]     Dear Colleague,    Thank you for referring your patient, Kyle Bermeo, to the [facility] ENDOCRINOLOGY at [facility]. Please see a copy of my visit note below.                             - Endocrinology Follow up -    Reason for visit/consult:  DM1 on insulin pump    Primary care provider: Confirmed, No PCP    HPI: A 36 yo male here for the initial visit for his DM1. He moved from [place] to [place] 10/2016, now starts to establish care in [place]. He was seen at [facility], Dr. Mario, last seen in 9/2016.    He has DM1 since age 8, at that time he had polyuria, glucose was 750 during the baseball game. Insulin pump (Medtronics) was started 15 years ago.    Update: he met mohit and andrey and he was started on new insulin pump 630G, which he feels satisfied. Compared to previous time, he started to feel that he bolus more properly than just keep pushing manual bolus. Again, concern and insertion site and he thinks based on the site, insulin absorption can be delayed and sometimes takes 2 hours to start to work after bolus.     He also think basal some point seems too much, but he is not sure what point. There is several episodes of glucose 70s 60s later afternoon, but looks after multiple bolus, and he does not want to change today, rather he wants to download by himself and wants to accumulate more data and want to report us.      Ref. Range 11/3/2017 16:15   Hemoglobin A1C Latest Ref Range: 4.3 - 6.0 % 7.5 (H)       Current regimnes:   MEdtronic 630G insulin pump   Carb ratio: 1:7 (all meals)  Sentivity 30  Active insulin tme: 4 hour    Average glucose 193    Basal  0:00    1.85  3:00    1.55  8:00    2.00  14:00  1.75  19:00  2.00  22:30  1.80    Life style:  Wake up 6am  Breakfast: skip, coffee only  Lunch: sandwich (70-90g)  No much snacks  Dinner: (70-90g carb)      DM  "complications:  Retinopathy: 7/2016 early retinopathy  Nephropathy: NEGATIVE (11/2017)  Neuropathy: no  Most recent LDL: 11/2017  60 (on lipitor)  TSH 0.93 (11/2017)    Past Medical/Surgical History:  Past Medical History:   Diagnosis Date     Diabetes mellitus type 1 (H)     since age 8, medtronics pump,      No past surgical history on file.    Allergies:  No Known Allergies    Current Medications   Current Outpatient Prescriptions   Medication     insulin lispro (HUMALOG) 100 UNIT/ML injection     insulin aspart (NOVOLOG PEN) 100 UNIT/ML injection     atorvastatin (LIPITOR) 10 MG tablet     Glucose Blood (BLOOD GLUCOSE TEST STRIPS) STRP     Lancets MISC     Multiple Vitamins-Minerals (MULTIVITAMIN ADULT PO)     VITAMIN D, CHOLECALCIFEROL, PO     [DISCONTINUED] insulin aspart (NOVOLOG VIAL) 100 UNITS/ML injection     No current facility-administered medications for this visit.        Family History:  No family history on file.  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      ROS:  Full review of systems taken with the help of the intake sheet. Otherwise a complete 14 point review of systems was taken and is negative unless stated in the history above.      Physical Exam:   Blood pressure (P) 137/70, pulse (P) 74, height 1.88 m (6' 2\"), weight 95.3 kg (210 lb).  General: well appearing, no acute distress, pleasant and conversant,   Mental Status/neuro: alert and oriented  Face: symmetrical, normal facial color  Eyes: anicteric, PERRL, no proptosis or lid lag  Neck: suppler, no lymphadenopahty  Thyroid: normal size and texture, no nodule palpable, no bruits  Heart: regular rhythm, S1S2, no murmur appreciated  Lung: clear to auscultation bilaterally  Abdomen: soft, " NT/ND, hard 5-6 cm multiple tissues palpable by deep palpation, around umbilicus to the side  Back: no palpable scar tissues  Legs: no swelling or edema  Feet: no deformities or ulcers, 2+ DP pulses, normal monofilament sensation    Assessment and Plan  37 year old male with DM1 on insulin pump, no DM complication, relatively stable slightly improving A1C,     - No change insulin pump setting, patient will download more information and noted some of basal may be too high but download this time was only 2 weks, he does not feel comfortable to adjust only with these data, which I agree.   - Overall control not bad, A1c 7.5, no other DM conplication  - Ophthalmology some point after holiday  - RTC with me in 6 month      I spent 30 minutes with this patient face to face and explained the conditions and plans (more than 50% of time was counseling/coordination of care, discussed about injection sites, discussed about bolus) . The patient understood and is satisfied with today's visit. Return to clinic with me in 6 months.         Lilia Mendoza MD  Staff Physician  Endocrinology and Metabolism  License: [id number]    Again, thank you for allowing me to participate in the care of your patient.      Sincerely,    Lilia Mendoza MD

## 2017-11-06 NOTE — PROGRESS NOTES
- Endocrinology Follow up -    Reason for visit/consult:  DM1 on insulin pump    Primary care provider: Confirmed, No PCP    HPI: A 36 yo male here for the initial visit for his DM1. He moved from [place] to [place] 10/2016, now starts to establish care in [place]. He was seen at [facility], Dr. Mario, last seen in 9/2016.    He has DM1 since age 8, at that time he had polyuria, glucose was 750 during the baseball game. Insulin pump (Medtronics) was started 15 years ago.    Update: he met mohit and andrey and he was started on new insulin pump 630G, which he feels satisfied. Compared to previous time, he started to feel that he bolus more properly than just keep pushing manual bolus. Again, concern and insertion site and he thinks based on the site, insulin absorption can be delayed and sometimes takes 2 hours to start to work after bolus.     He also think basal some point seems too much, but he is not sure what point. There is several episodes of glucose 70s 60s later afternoon, but looks after multiple bolus, and he does not want to change today, rather he wants to download by himself and wants to accumulate more data and want to report us.      Ref. Range 11/3/2017 16:15   Hemoglobin A1C Latest Ref Range: 4.3 - 6.0 % 7.5 (H)       Current regimnes:   MEdtronic 630G insulin pump   Carb ratio: 1:7 (all meals)  Sentivity 30  Active insulin tme: 4 hour    Average glucose 193    Basal  0:00    1.85  3:00    1.55  8:00    2.00  14:00  1.75  19:00  2.00  22:30  1.80    Life style:  Wake up 6am  Breakfast: skip, coffee only  Lunch: sandwich (70-90g)  No much snacks  Dinner: (70-90g carb)      DM complications:  Retinopathy: 7/2016 early retinopathy  Nephropathy: NEGATIVE (11/2017)  Neuropathy: no  Most recent LDL: 11/2017  60 (on lipitor)  TSH 0.93 (11/2017)    Past Medical/Surgical History:  Past Medical History:   Diagnosis Date     Diabetes mellitus type 1 (H)     since age  "8, medtronics pump,      No past surgical history on file.    Allergies:  No Known Allergies    Current Medications   Current Outpatient Prescriptions   Medication     insulin lispro (HUMALOG) 100 UNIT/ML injection     insulin aspart (NOVOLOG PEN) 100 UNIT/ML injection     atorvastatin (LIPITOR) 10 MG tablet     Glucose Blood (BLOOD GLUCOSE TEST STRIPS) STRP     Lancets MISC     Multiple Vitamins-Minerals (MULTIVITAMIN ADULT PO)     VITAMIN D, CHOLECALCIFEROL, PO     [DISCONTINUED] insulin aspart (NOVOLOG VIAL) 100 UNITS/ML injection     No current facility-administered medications for this visit.        Family History:  No family history on file.  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      ROS:  Full review of systems taken with the help of the intake sheet. Otherwise a complete 14 point review of systems was taken and is negative unless stated in the history above.      Physical Exam:   Blood pressure (P) 137/70, pulse (P) 74, height 1.88 m (6' 2\"), weight 95.3 kg (210 lb).  General: well appearing, no acute distress, pleasant and conversant,   Mental Status/neuro: alert and oriented  Face: symmetrical, normal facial color  Eyes: anicteric, PERRL, no proptosis or lid lag  Neck: suppler, no lymphadenopahty  Thyroid: normal size and texture, no nodule palpable, no bruits  Heart: regular rhythm, S1S2, no murmur appreciated  Lung: clear to auscultation bilaterally  Abdomen: soft, NT/ND, hard 5-6 cm multiple tissues palpable by deep palpation, around umbilicus to the side  Back: no palpable scar tissues  Legs: no swelling or edema  Feet: no deformities or ulcers, 2+ DP pulses, normal monofilament sensation    Assessment and Plan  37 year old male with DM1 on insulin " pump, no DM complication, relatively stable slightly improving A1C,     - No change insulin pump setting, patient will download more information and noted some of basal may be too high but download this time was only 2 weks, he does not feel comfortable to adjust only with these data, which I agree.   - Overall control not bad, A1c 7.5, no other DM conplication  - Ophthalmology some point after holiday  - RTC with me in 6 month      I spent 30 minutes with this patient face to face and explained the conditions and plans (more than 50% of time was counseling/coordination of care, discussed about injection sites, discussed about bolus) . The patient understood and is satisfied with today's visit. Return to clinic with me in 6 months.         Lilia Mendoza MD  Staff Physician  Endocrinology and Metabolism  License: [id number]

## 2017-11-28 DIAGNOSIS — E10.9 DIABETES MELLITUS TYPE 1 (H): Primary | ICD-10-CM

## 2017-12-08 ENCOUNTER — TELEPHONE (OUTPATIENT)
Dept: ENDOCRINOLOGY | Facility: CLINIC | Age: 37
End: 2017-12-08

## 2017-12-08 DIAGNOSIS — E10.9 DIABETES MELLITUS TYPE 1 (H): Primary | ICD-10-CM

## 2018-03-20 DIAGNOSIS — E10.9 TYPE 1 DIABETES MELLITUS (H): Primary | ICD-10-CM

## 2018-03-20 DIAGNOSIS — E10.9 TYPE 1 DIABETES MELLITUS WITHOUT COMPLICATION (H): ICD-10-CM

## 2018-03-21 RX ORDER — ATORVASTATIN CALCIUM 10 MG/1
TABLET, FILM COATED ORAL
Qty: 90 TABLET | Refills: 1 | Status: SHIPPED | OUTPATIENT
Start: 2018-03-21 | End: 2018-11-15

## 2018-11-13 DIAGNOSIS — E10.9 TYPE 1 DIABETES MELLITUS (H): ICD-10-CM

## 2018-11-15 DIAGNOSIS — E10.9 TYPE 1 DIABETES MELLITUS WITHOUT COMPLICATION (H): ICD-10-CM

## 2018-11-16 RX ORDER — ATORVASTATIN CALCIUM 10 MG/1
TABLET, FILM COATED ORAL
Qty: 90 TABLET | Refills: 0 | Status: SHIPPED | OUTPATIENT
Start: 2018-11-16 | End: 2019-05-16

## 2018-11-27 ENCOUNTER — PATIENT OUTREACH (OUTPATIENT)
Dept: CARE COORDINATION | Facility: CLINIC | Age: 38
End: 2018-11-27

## 2018-11-28 ENCOUNTER — OFFICE VISIT (OUTPATIENT)
Dept: ENDOCRINOLOGY | Facility: CLINIC | Age: 38
End: 2018-11-28
Payer: COMMERCIAL

## 2018-11-28 VITALS
DIASTOLIC BLOOD PRESSURE: 72 MMHG | HEART RATE: 59 BPM | BODY MASS INDEX: 27.31 KG/M2 | HEIGHT: 74 IN | WEIGHT: 212.8 LBS | SYSTOLIC BLOOD PRESSURE: 145 MMHG

## 2018-11-28 DIAGNOSIS — Z46.81 INSULIN PUMP TITRATION: ICD-10-CM

## 2018-11-28 DIAGNOSIS — E10.9 TYPE 1 DIABETES MELLITUS WITHOUT COMPLICATION (H): Primary | ICD-10-CM

## 2018-11-28 DIAGNOSIS — Z13.29 SCREENING FOR THYROID DISORDER: ICD-10-CM

## 2018-11-28 DIAGNOSIS — E10.9 TYPE 1 DIABETES MELLITUS WITHOUT COMPLICATION (H): ICD-10-CM

## 2018-11-28 LAB
ALBUMIN SERPL-MCNC: 3.6 G/DL (ref 3.4–5)
ALP SERPL-CCNC: 82 U/L (ref 40–150)
ALT SERPL W P-5'-P-CCNC: 28 U/L (ref 0–70)
ANION GAP SERPL CALCULATED.3IONS-SCNC: 8 MMOL/L (ref 3–14)
AST SERPL W P-5'-P-CCNC: 8 U/L (ref 0–45)
BILIRUB SERPL-MCNC: 0.3 MG/DL (ref 0.2–1.3)
BUN SERPL-MCNC: 9 MG/DL (ref 7–30)
CALCIUM SERPL-MCNC: 9 MG/DL (ref 8.5–10.1)
CHLORIDE SERPL-SCNC: 103 MMOL/L (ref 94–109)
CHOLEST SERPL-MCNC: 130 MG/DL
CO2 SERPL-SCNC: 25 MMOL/L (ref 20–32)
CREAT SERPL-MCNC: 0.9 MG/DL (ref 0.66–1.25)
CREAT UR-MCNC: 31 MG/DL
GFR SERPL CREATININE-BSD FRML MDRD: >90 ML/MIN/1.7M2
GLUCOSE SERPL-MCNC: 229 MG/DL (ref 70–99)
HBA1C MFR BLD: 7.9 % (ref 4.3–6)
HDLC SERPL-MCNC: 43 MG/DL
LDLC SERPL CALC-MCNC: 70 MG/DL
MICROALBUMIN UR-MCNC: <5 MG/L
MICROALBUMIN/CREAT UR: NORMAL MG/G CR (ref 0–17)
NONHDLC SERPL-MCNC: 86 MG/DL
POTASSIUM SERPL-SCNC: 4.3 MMOL/L (ref 3.4–5.3)
PROT SERPL-MCNC: 7.4 G/DL (ref 6.8–8.8)
SODIUM SERPL-SCNC: 136 MMOL/L (ref 133–144)
T4 FREE SERPL-MCNC: 0.83 NG/DL (ref 0.76–1.46)
TRIGL SERPL-MCNC: 84 MG/DL
TSH SERPL DL<=0.005 MIU/L-ACNC: 0.86 MU/L (ref 0.4–4)

## 2018-11-28 ASSESSMENT — PAIN SCALES - GENERAL: PAINLEVEL: NO PAIN (0)

## 2018-11-28 NOTE — MR AVS SNAPSHOT
"              After Visit Summary   11/28/2018    Kyle Bermeo    MRN: [id number]           Patient Information     Date Of Birth          1980        Visit Information        Provider Department      11/28/2018 7:00 AM Lilia Mendoza MD  [facility]        Today's Diagnoses     Type 1 diabetes mellitus without complication (H)    -  1    Screening for thyroid disorder        Insulin pump titration           Follow-ups after your visit        Follow-up notes from your care team     Return in about 1 year (around 11/28/2019).      Who to contact     Please call your clinic at [phone] to:    Ask questions about your health    Make or cancel appointments    Discuss your medicines    Learn about your test results    Speak to your doctor            Additional Information About Your Visit        Shut [employer]eb[employer] Information     [employer]Better gives you secure access to your electronic health record. If you see a primary care provider, you can also send messages to your care team and make appointments. If you have questions, please call your primary care clinic.  If you do not have a primary care provider, please call [phone] and they will assist you.      [employer]Better is an electronic gateway that provides easy, online access to your medical records. With [employer], you can request a clinic appointment, read your test results, renew a prescription or communicate with your care team.     To access your existing account, please contact your [facility] or call [phone] for assistance.        Care EveryWhere ID     This is your Care EveryWhere ID. This could be used by other organizations to access your [place] medical records  [id number]        Your Vitals Were     Pulse Height BMI (Body Mass Index)             59 1.87 m (6' 1.62\") 27.6 kg/m2          Blood Pressure from Last 3 Encounters:   11/28/18 145/72   11/06/17 (P) 137/70   05/10/17 140/84    Weight from Last 3 Encounters: "   11/28/18 96.5 kg (212 lb 12.8 oz)   11/06/17 95.3 kg (210 lb)   05/10/17 94.1 kg (207 lb 8 oz)              We Performed the Following     Continuous Glucose Monitoring >=72 hours PHYS INTERP     Hemoglobin A1c POCT        Primary Care Provider    None Specified       No primary provider on file.        Equal Access to Services     CLEMENTINA SALAZAR : Hadii teodoro ku hadjabiero Somarisabelali, waaxda luqadaha, qaybta kaalmada tonywaltraúl, kaye garcia reinamansi finn sarabenji lei . [facility] [phone].    ATENCIÓN: Si habla español, tiene a conroy disposición servicios gratuitos de asistencia lingüística. Llame al [phone].    We comply with applicable federal civil rights laws and [place] laws. We do not discriminate on the basis of race, color, national origin, age, disability, sex, sexual orientation, or gender identity.            Thank you!     Thank you for choosing [facility] ENDOCRINOLOGY  for your care. Our goal is always to provide you with excellent care. Hearing back from our patients is one way we can continue to improve our services. Please take a few minutes to complete the written survey that you may receive in the mail after your visit with us. Thank you!             Your Updated Medication List - Protect others around you: Learn how to safely use, store and throw away your medicines at www.disposemymeds.org.          This list is accurate as of 11/28/18  8:33 AM.  Always use your most recent med list.                   Brand Name Dispense Instructions for use Diagnosis    atorvastatin 10 MG tablet    LIPITOR    90 tablet    Take 1 tablet daily  By mouth    Type 1 diabetes mellitus without complication (H)       * BLOOD GLUCOSE TEST STRIPS Strp      10 times daily    Type 1 diabetes mellitus without complication (H)       * blood glucose monitoring test strip    NO BRAND SPECIFIED    300 strip    Use to test blood sugar 10 times daily or as directed.    Diabetes mellitus type 1 (H)       insulin aspart 100 UNITS/ML  vial    NovoLOG VIAL    100 mL    Use in pump basal rate + meal coverage. Approx. 96 units daily. Please be seen in clinic for future refills.    Type 1 diabetes mellitus (H)       insulin lispro 100 UNIT/ML vial    humaLOG    90 mL    Use in pump basal rate + meal coverage. Approx. 96 units daily.    Diabetes mellitus type 1 (H)       Lancets Misc      checks 10 times daily    Type 1 diabetes mellitus without complication (H)       MULTIVITAMIN ADULT PO       Type 1 diabetes mellitus without complication (H)       VITAMIN D (CHOLECALCIFEROL) PO      Take by mouth as needed TAKES IN WINTER MONTHS    Type 1 diabetes mellitus without complication (H)       * Notice:  This list has 2 medication(s) that are the same as other medications prescribed for you. Read the directions carefully, and ask your doctor or other care provider to review them with you.

## 2018-11-28 NOTE — LETTER
11/28/2018       RE: Kyle Bermeo  [address] [place] [address]     Dear Colleague,    Thank you for referring your patient, Kyle Bermeo, to the [facility] ENDOCR[facility] at [facility]. Please see a copy of my visit note below.                             - Endocrinology Follow up -    Reason for visit/consult:  DM1 on insulin pump    Primary care provider: Confirmed, No PCP    Assessment and Plan  A 38 year old male with DM1 on insulin pump 630G, no DM complication, A1C 7.9,     # DM1  A1c 7.9, slightly increased but relatively stable.  Reviewing CGM noticed a pattern of mild hypoglycemia in the morning and hypoglycemia later afternoon.  We will change to sitting over the basal today.     Basal new from 11/28/2018  0:00    1.85  3:00    1.7  6:00     1.85  8:00    2.00  14:00   2.00  19:00  2.00  22:30  1.80    #Morning hyperglycemia  May due to coritsol rise as well as lower setting of basal.   Increase basal slightyly in the morning    # Afternoon hyperglycemia  Patient noticed border seen sitting started to work delay. He also noticed hyper then hypo.   No change sitting on the bolus, slightly increased basal in the afternoon.  Encourage patient to bolus slightly earlier.    #Diabetes complications  No complications noticed,   Due for ophthalmology.  Screen for urine microalbumin CMP TSH free T4 and a fasting lipid.    RTC with me in 1 year    I spent 30 minutes with this patient face to face and explained the conditions and plans (more than 50% of time was counseling/coordination of care, discussed about injection sites, discussed about bolus) . The patient understood and is satisfied with today's visit. Return to clinic with me in 1 year.         Lilia Mendoza MD  Staff Physician  Endocrinology and Metabolism  License: [id number]    Interval History: This is third visit. Last visit was 1 year ago. Had a baby in his family 7/2018, busy and lack of sleep. Noticed mornign  spikes (240s) without breakfast, and also afternoon spike.   HPI: A 39 yo male here for the initial visit for his DM1. He moved from [place] to [place] 10/2016, now starts to establish care in [place]. He was seen at [facility], Dr. Mario, last seen in 9/2016.    He has DM1 since age 8, at that time he had polyuria, glucose was 750 during the baseball game. Insulin pump ([employer]) was started 15 years ago.    Update: he met mohit and andrey and he was started on new insulin pump 630G, which he feels satisfied. Compared to previous time, he started to feel that he bolus more properly than just keep pushing manual bolus. Again, concern and insertion site and he thinks based on the site, insulin absorption can be delayed and sometimes takes 2 hours to start to work after bolus.     He also think basal some point seems too much, but he is not sure what point. There is several episodes of glucose 70s 60s later afternoon, but looks after multiple bolus, and he does not want to change today, rather he wants to download by himself and wants to accumulate more data and want to report us.        Ref. Range 5/10/2017 00:00 11/3/2017 16:15 11/28/2018 00:00   Hemoglobin A1C Latest Ref Range: 4.3 - 6.0 %  7.5 (H)    Hemoglobin A1C Latest Ref Range: 4.3 - 6.0 % 7.9 (A)  7.9 (A)       Current regimnes:   MEdtronic 630G insulin pump   Carb ratio: 1:7 (all meals)  Sentivity 30  Active insulin tme: 4 hour    Average glucose 193    Basal  0:00    1.85  3:00    1.7  6:00     1.85  8:00    2.00  14:00   2.00  19:00  2.00  22:30  1.80    Life style:  Wake up 6am  Breakfast: skip, coffee only  Lunch: sandwich (70-90g)  No much snacks  Dinner: (70-90g carb)      DM complications:  Retinopathy: 7/2016 early retinopathy, need to go.   Nephropathy: NEGATIVE (11/2017) due today  Neuropathy: no  Most recent LDL: 11/2017  60 (on lipitor)  TSH 0.93 (11/2017)    Continues glucose monitoring: We downloaded CGM and insulin pump and  "summarized here.  CGM: There is two trends of hyperglycemia in the morning 6-8 AM cortisol later afternoon spike 3-5 PM.  Average glucose 202   72    Basal: 60%, Bolus: 40%  Total daily insulin : 71 units/day    Past Medical/Surgical History:  Past Medical History:   Diagnosis Date     Diabetes mellitus type 1 (H)     since age 8, medtronics pump,      History reviewed. No pertinent surgical history.    Allergies:  No Known Allergies    Current Medications   Current Outpatient Prescriptions   Medication     atorvastatin (LIPITOR) 10 MG tablet     blood glucose monitoring (NO BRAND SPECIFIED) test strip     Glucose Blood (BLOOD GLUCOSE TEST STRIPS) STRP     insulin aspart (NOVOLOG VIAL) 100 UNITS/ML injection     Lancets MISC     Multiple Vitamins-Minerals (MULTIVITAMIN ADULT PO)     VITAMIN D, CHOLECALCIFEROL, PO     insulin lispro (HUMALOG) 100 UNIT/ML injection     No current facility-administered medications for this visit.        Family History:  History reviewed. No pertinent family history.  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      ROS:  Full review of systems taken with the help of the intake sheet. Otherwise a complete 14 point review of systems was taken and is negative unless stated in the history above.      Physical Exam:   Blood pressure 145/72, pulse 59, height 1.87 m (6' 1.62\"), weight 96.5 kg (212 lb 12.8 oz).  General: well appearing, no acute distress, pleasant and conversant,   Mental Status/neuro: alert and oriented  Face: symmetrical, normal facial color  Eyes: anicteric, PERRL, no proptosis or lid lag  Neck: suppler, no lymphadenopahty  Thyroid: normal size and texture, no nodule palpable, no bruits  Heart: regular rhythm, S1S2, no murmur " appreciated  Lung: clear to auscultation bilaterally  Abdomen: soft, NT/ND, hard 5-6 cm multiple tissues palpable by deep palpation, around umbilicus to the side  Back: no palpable scar tissues  Legs: no swelling or edema  Feet: no deformities or ulcers, 2+ DP pulses, normal monofilament sensation        Again, thank you for allowing me to participate in the care of your patient.      Sincerely,    Lilia Mendoza MD

## 2018-11-28 NOTE — PROGRESS NOTES
- Endocrinology Follow up -    Reason for visit/consult:  DM1 on insulin pump    Primary care provider: Confirmed, No PCP    Assessment and Plan  A 38 year old male with DM1 on insulin pump 630G, no DM complication, A1C 7.9,     # DM1  A1c 7.9, slightly increased but relatively stable.  Reviewing CGM noticed a pattern of mild hypoglycemia in the morning and hypoglycemia later afternoon.  We will change to sitting over the basal today.     Basal new from 11/28/2018  0:00    1.85  3:00    1.7  6:00     1.85  8:00    2.00  14:00   2.00  19:00  2.00  22:30  1.80    #Morning hyperglycemia  May due to coritsol rise as well as lower setting of basal.   Increase basal slightyly in the morning    # Afternoon hyperglycemia  Patient noticed border seen sitting started to work delay. He also noticed hyper then hypo.   No change sitting on the bolus, slightly increased basal in the afternoon.  Encourage patient to bolus slightly earlier.    #Diabetes complications  No complications noticed,   Due for ophthalmology.  Screen for urine microalbumin CMP TSH free T4 and a fasting lipid.    RTC with me in 1 year    I spent 30 minutes with this patient face to face and explained the conditions and plans (more than 50% of time was counseling/coordination of care, discussed about injection sites, discussed about bolus) . The patient understood and is satisfied with today's visit. Return to clinic with me in 1 year.         Lilia Mendoza MD  Staff Physician  Endocrinology and Metabolism  License: [id number]    Interval History: This is third visit. Last visit was 1 year ago. Had a baby in his family 7/2018, busy and lack of sleep. Noticed mornign spikes (240s) without breakfast, and also afternoon spike.   HPI: A 37 yo male here for the initial visit for his DM1. He moved from [place] to [place] 10/2016, now starts to establish care in [place]. He was seen at [facility], Dr. Mario, last seen in  9/2016.    He has DM1 since age 8, at that time he had polyuria, glucose was 750 during the baseball game. Insulin pump ([employer]) was started 15 years ago.    Update: he met mohit and andrey and he was started on new insulin pump 630G, which he feels satisfied. Compared to previous time, he started to feel that he bolus more properly than just keep pushing manual bolus. Again, concern and insertion site and he thinks based on the site, insulin absorption can be delayed and sometimes takes 2 hours to start to work after bolus.     He also think basal some point seems too much, but he is not sure what point. There is several episodes of glucose 70s 60s later afternoon, but looks after multiple bolus, and he does not want to change today, rather he wants to download by himself and wants to accumulate more data and want to report us.        Ref. Range 5/10/2017 00:00 11/3/2017 16:15 11/28/2018 00:00   Hemoglobin A1C Latest Ref Range: 4.3 - 6.0 %  7.5 (H)    Hemoglobin A1C Latest Ref Range: 4.3 - 6.0 % 7.9 (A)  7.9 (A)       Current regimnes:   MEdtronic 630G insulin pump   Carb ratio: 1:7 (all meals)  Sentivity 30  Active insulin tme: 4 hour    Average glucose 193    Basal  0:00    1.85  3:00    1.7  6:00     1.85  8:00    2.00  14:00   2.00  19:00  2.00  22:30  1.80    Life style:  Wake up 6am  Breakfast: skip, coffee only  Lunch: sandwich (70-90g)  No much snacks  Dinner: (70-90g carb)      DM complications:  Retinopathy: 7/2016 early retinopathy, need to go.   Nephropathy: NEGATIVE (11/2017) due today  Neuropathy: no  Most recent LDL: 11/2017  60 (on lipitor)  TSH 0.93 (11/2017)    Continues glucose monitoring: We downloaded CGM and insulin pump and summarized here.  CGM: There is two trends of hyperglycemia in the morning 6-8 AM cortisol later afternoon spike 3-5 PM.  Average glucose 202   72    Basal: 60%, Bolus: 40%  Total daily insulin : 71 units/day    Past Medical/Surgical History:  Past Medical History:  "  Diagnosis Date     Diabetes mellitus type 1 (H)     since age 8, medtronics pump,      History reviewed. No pertinent surgical history.    Allergies:  No Known Allergies    Current Medications   Current Outpatient Prescriptions   Medication     atorvastatin (LIPITOR) 10 MG tablet     blood glucose monitoring (NO BRAND SPECIFIED) test strip     Glucose Blood (BLOOD GLUCOSE TEST STRIPS) STRP     insulin aspart (NOVOLOG VIAL) 100 UNITS/ML injection     Lancets MISC     Multiple Vitamins-Minerals (MULTIVITAMIN ADULT PO)     VITAMIN D, CHOLECALCIFEROL, PO     insulin lispro (HUMALOG) 100 UNIT/ML injection     No current facility-administered medications for this visit.        Family History:  History reviewed. No pertinent family history.  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      ROS:  Full review of systems taken with the help of the intake sheet. Otherwise a complete 14 point review of systems was taken and is negative unless stated in the history above.      Physical Exam:   Blood pressure 145/72, pulse 59, height 1.87 m (6' 1.62\"), weight 96.5 kg (212 lb 12.8 oz).  General: well appearing, no acute distress, pleasant and conversant,   Mental Status/neuro: alert and oriented  Face: symmetrical, normal facial color  Eyes: anicteric, PERRL, no proptosis or lid lag  Neck: suppler, no lymphadenopahty  Thyroid: normal size and texture, no nodule palpable, no bruits  Heart: regular rhythm, S1S2, no murmur appreciated  Lung: clear to auscultation bilaterally  Abdomen: soft, NT/ND, hard 5-6 cm multiple tissues palpable by deep palpation, around umbilicus to the side  Back: no palpable scar tissues  Legs: no swelling or edema  Feet: no deformities or ulcers, 2+ DP pulses, " normal monofilament sensation

## 2018-11-28 NOTE — NURSING NOTE
Chief Complaint   Patient presents with     RECHECK     DM1     Capillary puncture performed for Hemoglobin A1C test. Patient tolerated well.    Susi Arredondo MA

## 2018-12-01 NOTE — PROGRESS NOTES
Dear Kyle     It was very nice to see you again. Here is your results. It is within normal limits (excecpt glucose).  Please continue the plan we discussed.   Please call nursing line at [phone] if you have any questions.    Congratulations for you guys!    Take care  Lilia Mendoza MD

## 2019-01-18 ENCOUNTER — TRANSFERRED RECORDS (OUTPATIENT)
Dept: HEALTH INFORMATION MANAGEMENT | Facility: CLINIC | Age: 39
End: 2019-01-18

## 2019-03-17 DIAGNOSIS — E10.9 TYPE 1 DIABETES MELLITUS (H): ICD-10-CM

## 2019-03-18 NOTE — TELEPHONE ENCOUNTER
insulin aspart (NOVOLOG VIAL) 100 UNITS/ML vial      Last Written Prescription Date:  11-13-18  Last Fill Quantity: 100 ml,   # refills: 0  Last Office Visit : 11-28-18  Future Office visit:  none    Routing refill request to provider for review/approval because:  Insulin - refilled per clinic

## 2019-05-16 DIAGNOSIS — E10.9 TYPE 1 DIABETES MELLITUS WITHOUT COMPLICATION (H): ICD-10-CM

## 2019-05-16 RX ORDER — ATORVASTATIN CALCIUM 10 MG/1
TABLET, FILM COATED ORAL
Qty: 90 TABLET | Refills: 1 | Status: SHIPPED | OUTPATIENT
Start: 2019-05-16 | End: 2019-12-09

## 2019-05-16 NOTE — TELEPHONE ENCOUNTER
atorvastatin (LIPITOR) 10 MG tablet  Last Written Prescription Date:  11/16/18  Last Fill Quantity: 90   # refills: 0  Last Office Visit :11/28/18  Future Office visit:  none

## 2019-05-16 NOTE — TELEPHONE ENCOUNTER
[facility] Call Center    Phone Message    May a detailed message be left on voicemail: yes    Reason for Call: Other: Pharmacy is calling needing Dr. Jefferson Mendoza to send a refill for LIPTIOR 10mg for the pharmacy, please call the pharmacy;s # is [phone] reference# [id number], please call the pharmacy back asap, thank you     Action Taken: Message routed to:  [facility] ([facility] Endrocrinology

## 2019-10-07 ENCOUNTER — DOCUMENTATION ONLY (OUTPATIENT)
Dept: ENDOCRINOLOGY | Facility: CLINIC | Age: 39
End: 2019-10-07

## 2019-10-07 ENCOUNTER — MEDICAL CORRESPONDENCE (OUTPATIENT)
Dept: HEALTH INFORMATION MANAGEMENT | Facility: CLINIC | Age: 39
End: 2019-10-07

## 2019-10-07 NOTE — PROGRESS NOTES
Forms received from: [employer]     Forms were prescription for pump supplies and diabetic testing supplies     Faxed Date:10/7/19

## 2019-12-04 ENCOUNTER — MEDICAL CORRESPONDENCE (OUTPATIENT)
Dept: HEALTH INFORMATION MANAGEMENT | Facility: CLINIC | Age: 39
End: 2019-12-04

## 2019-12-09 DIAGNOSIS — E10.9 TYPE 1 DIABETES MELLITUS (H): ICD-10-CM

## 2019-12-09 DIAGNOSIS — E10.9 TYPE 1 DIABETES MELLITUS WITHOUT COMPLICATION (H): ICD-10-CM

## 2019-12-10 DIAGNOSIS — E10.9 TYPE 1 DIABETES MELLITUS WITHOUT COMPLICATION (H): Primary | ICD-10-CM

## 2019-12-10 DIAGNOSIS — E10.9 TYPE 1 DIABETES MELLITUS WITHOUT COMPLICATION (H): ICD-10-CM

## 2019-12-10 RX ORDER — PROCHLORPERAZINE 25 MG/1
1 SUPPOSITORY RECTAL DAILY
Qty: 1 DEVICE | Refills: 0 | Status: SHIPPED | OUTPATIENT
Start: 2019-12-10 | End: 2022-09-23

## 2019-12-10 RX ORDER — PROCHLORPERAZINE 25 MG/1
1 SUPPOSITORY RECTAL DAILY
Qty: 1 DEVICE | Refills: 0 | Status: SHIPPED | OUTPATIENT
Start: 2019-12-10 | End: 2019-12-10

## 2019-12-10 RX ORDER — PROCHLORPERAZINE 25 MG/1
3 SUPPOSITORY RECTAL
Qty: 3 EACH | Refills: 11 | Status: SHIPPED | OUTPATIENT
Start: 2019-12-10 | End: 2020-09-01

## 2019-12-10 RX ORDER — PROCHLORPERAZINE 25 MG/1
1 SUPPOSITORY RECTAL
Qty: 1 EACH | Refills: 3 | Status: SHIPPED | OUTPATIENT
Start: 2019-12-10 | End: 2020-09-01

## 2019-12-10 RX ORDER — ATORVASTATIN CALCIUM 10 MG/1
TABLET, FILM COATED ORAL
Qty: 90 TABLET | Refills: 0 | Status: SHIPPED | OUTPATIENT
Start: 2019-12-10 | End: 2020-05-19

## 2019-12-10 NOTE — TELEPHONE ENCOUNTER
insulin aspart (NOVOLOG VIAL) 100 UNITS/ML vial      Last Written Prescription Date:  3-18-19  Last Fill Quantity: 100 ml,   # refills: 2  Last Office Visit : 11-28-18  Future Office visit:  2-3-20    Routing refill request to provider for review/approval because:  Insulin - refilled per clinic

## 2019-12-10 NOTE — TELEPHONE ENCOUNTER
Last Clinic Visit: 11/28/2018  [facility] Endocrinology (RTC 1 Y)  Next Clinic visit: 2-3-20   Past due lab: LDL, RF till RTC

## 2020-01-11 ENCOUNTER — VIRTUAL VISIT (OUTPATIENT)
Dept: FAMILY MEDICINE | Facility: OTHER | Age: 40
End: 2020-01-11

## 2020-01-11 NOTE — PROGRESS NOTES
"Date: 2020 10:25:38  Clinician: Pravin Nair  Clinician NPI: [id number]  Patient: Taj Bermeo  Patient : 1980  Patient Address: [facility] [place] [address]  Patient Phone: [phone]  Visit Protocol: Eye conditions  Patient Summary:  Taj is a 39 year old (: 1980 ) male who initiated a Visit for conjunctivitis.  When asked the question \"Please sign me up to receive news, health information and promotions. \", Taj responded \"No\".    Images of his eye condition were uploaded.   His symptoms started 1-2 days ago and affect both eyes. The symptoms consist of eye redness, itchy eye(s), and drainage coming from the eye(s).   Symptom details     Drainage: The color of the drainage coming out of his eye(s) is yellow. The drainage is thick and causes his eyelids to be stuck shut in the morning.    Itchiness: Taj does not have seasonal allergies or hay fever.     Denied symptoms include bumps on the eyelid, eyelid swelling, light sensitivity, and eye pain. Taj does not have subconjunctival hemorrhage and has not experienced a decrease in vision. He does not feel feverish.   Precipitating events  Taj has recently been exposed to someone with a red eye or an eye infection. Additionally, Taj wears contact lenses. He has not slept in them in the past week. In the past 2 weeks, he has had a cold or an ear infection.   Taj has not had a recent diagnosis of conjunctivitis. He also has not had a recent foreign body in the eye(s), eye injury, and eye surgery.   Pertinent medical history  Taj has not ever been diagnosed with glaucoma.   Taj has been using gentamicin ophthalmic solution to treat his current symptoms.   Medication efficacy as reported by the patient (free text): Works, but bottle ran out.  Was prescribed for son 1.5wks ago, since then both I and my daughter have gotten pink eye - used solution to treat all and bottle now empty.   Taj does not require proof of " "evaluation of his eye condition before returning to school, work, or .   Taj does not smoke or use smokeless tobacco.   Additional information as reported by the patient (free text): House has all been exposed to pink eye and 3/4 family members currently symptomatic.     MEDICATIONS: Novolog U-100 Insulin aspart subcutaneous, atorvastatin oral, ALLERGIES: NKDA  Clinician Response:  Dear Taj,  Based on the information provided, you most likely have bacterial conjunctivitis, more commonly called pink eye.  Medication information  I am prescribing:  Polymyxin B sulf-trimethoprim (Polytrim) 10,000 unit- 1 mg/mL ophthalmic (eye) drops. Apply 1 drop into the affected eye(s) every 3 hours, up to 6 times a day for 7 days. There are no refills with this prescription.  The medication I prescribed is an antibiotic medication. Infections can be caused by either bacteria or a virus, and often have similar symptoms, so it is possible that this is a viral infection. Antibiotics are only effective against bacterial infections, so when it is caused by a virus, the medication will not help symptoms improve or make it less contagious.  Self care  To reduce the spread of the eye infection, you should not wear contacts until the infection has fully resolved, and be sure to wash your hands at least once per hour and avoid touching the eyes as much as possible.  The following will reduce the risk for future eye infections:     Frequent handwashing    Replace towels and washcloths daily    Do not share towels and washcloths with others    Replace contacts and cases used while eyes were infected    Do not sleep in contacts that are not FDA-approved for overnight wear    Do not reuse or \"top off\" contact solution     Steps you can take to be as comfortable as possible:     Avoid rubbing your eyes    Apply a cool compress to the eye(s)    Take regular breaks and remember to blink regularly when reading or using a computer for " long periods of time    Wear sunglasses when outside    Wear eye protection when swimming or working with chemicals    Use good lighting     When to seek care  Please make an appointment to be seen in a clinic or urgent care if any of the following occurs:     You develop new symptoms or your symptoms becomes worse.    Your symptoms do not improve within 2 days of starting treatment.      Diagnosis: Bacterial conjunctivitis  Diagnosis ICD: H10.9  Prescription: polymyxin B sulf-trimethoprim (Polytrim) 10,000 unit- 1 mg/mL ophthalmic (eye) drops 1 10 ml dropper bottle, 7 days supply. Apply 1 drop into the affected eye(s) every 3 hours up to 6 times a day for 7 days. Refills: 0, Refill as needed: no, Allow substitutions: yes  Pharmacy: [id number] - [phone] - [address] [place] [address]

## 2020-02-03 ENCOUNTER — OFFICE VISIT (OUTPATIENT)
Dept: ENDOCRINOLOGY | Facility: CLINIC | Age: 40
End: 2020-02-03

## 2020-02-03 VITALS
DIASTOLIC BLOOD PRESSURE: 81 MMHG | SYSTOLIC BLOOD PRESSURE: 135 MMHG | HEIGHT: 74 IN | WEIGHT: 209.3 LBS | HEART RATE: 74 BPM | BODY MASS INDEX: 26.86 KG/M2

## 2020-02-03 DIAGNOSIS — E10.9 TYPE 1 DIABETES MELLITUS WITHOUT COMPLICATION (H): ICD-10-CM

## 2020-02-03 DIAGNOSIS — E10.9 DIABETES MELLITUS TYPE 1 (H): ICD-10-CM

## 2020-02-03 DIAGNOSIS — Z46.81 INSULIN PUMP TITRATION: Primary | ICD-10-CM

## 2020-02-03 LAB
ALBUMIN SERPL-MCNC: 3.7 G/DL (ref 3.4–5)
ALP SERPL-CCNC: 83 U/L (ref 40–150)
ALT SERPL W P-5'-P-CCNC: 28 U/L (ref 0–70)
ANION GAP SERPL CALCULATED.3IONS-SCNC: 5 MMOL/L (ref 3–14)
AST SERPL W P-5'-P-CCNC: 12 U/L (ref 0–45)
BILIRUB SERPL-MCNC: 0.4 MG/DL (ref 0.2–1.3)
BUN SERPL-MCNC: 10 MG/DL (ref 7–30)
CALCIUM SERPL-MCNC: 9.4 MG/DL (ref 8.5–10.1)
CHLORIDE SERPL-SCNC: 104 MMOL/L (ref 94–109)
CHOLEST SERPL-MCNC: 133 MG/DL
CO2 SERPL-SCNC: 29 MMOL/L (ref 20–32)
CREAT SERPL-MCNC: 0.86 MG/DL (ref 0.66–1.25)
CREAT UR-MCNC: 24 MG/DL
GFR SERPL CREATININE-BSD FRML MDRD: >90 ML/MIN/{1.73_M2}
GLUCOSE SERPL-MCNC: 274 MG/DL (ref 70–99)
HBA1C MFR BLD: 7.9 % (ref 4.3–6)
HDLC SERPL-MCNC: 42 MG/DL
LDLC SERPL CALC-MCNC: 83 MG/DL
MICROALBUMIN UR-MCNC: <5 MG/L
MICROALBUMIN/CREAT UR: NORMAL MG/G CR (ref 0–17)
NONHDLC SERPL-MCNC: 91 MG/DL
POTASSIUM SERPL-SCNC: 4.2 MMOL/L (ref 3.4–5.3)
PROT SERPL-MCNC: 7.3 G/DL (ref 6.8–8.8)
SODIUM SERPL-SCNC: 137 MMOL/L (ref 133–144)
T4 FREE SERPL-MCNC: 0.84 NG/DL (ref 0.76–1.46)
TRIGL SERPL-MCNC: 41 MG/DL
TSH SERPL DL<=0.005 MIU/L-ACNC: 0.66 MU/L (ref 0.4–4)

## 2020-02-03 ASSESSMENT — MIFFLIN-ST. JEOR: SCORE: 1928.13

## 2020-02-03 ASSESSMENT — PAIN SCALES - GENERAL: PAINLEVEL: NO PAIN (0)

## 2020-02-03 NOTE — NURSING NOTE
Chief Complaint   Patient presents with     RECHECK     Type 1 Diabetes     Capillary puncture performed for Hemoglobin A1C test. Patient tolerated well.    Susi Arredondo MA

## 2020-02-03 NOTE — PROGRESS NOTES
- Endocrinology Follow up -    Reason for visit/consult:  DM1 on insulin pump    Primary care provider: Confirmed, No PCP    Assessment and Plan  A 38 year old male with DM1 on insulin pump 630G, no DM complication, A1C 7.9,     # DM1  A1c 7.9 same since last year.  Hyperglycemia later afternoon with wide fluctuation.  We will change to setting the basal bolus today as follows.     Basal new from 2/3/2020  0:00    1.85  3:00    1.7  6:00     1.85  8:00    2.00  11:30   2.30 (new, was 2.0)  16:30  2.00  22:30  1.80    Bolus new from 2/3/2020  0:00 6.0 (was 7.0)    If breakfst and dinner low, then do (I gave option)  0:00 7.0  11:00 6.0  14:00 7.0     # Afternoon hyperglycemia  After lunch persistently 250-300s until before dinner.   Change made above.     # DM device  Patient want sot switch to Tandem/Dexcom    #Diabetes complications  No complications noticed,   Ophthalmology updated.  Screen for urine microalbumin CMP TSH free T4 and a fasting lipid.    RTC with me in 1 year    I spent 30 minutes with this patient face to face and explained the conditions and plans (more than 50% of time was counseling/coordination of care, discussed about injection sites, discussed about bolus) . The patient understood and is satisfied with today's visit. Return to clinic with me in 1 year.         Lilia Mendoza MD  Staff Physician  Endocrinology and Metabolism  License: [id number]    Interval History as of 2/3/2020 : Patient has been doing well. Last seen 14 month ago.  Interval History: This is third visit. Last visit was 1 year ago. Had a baby in his family 7/2018, busy and lack of sleep. Noticed mornign spikes (240s) without breakfast, and also afternoon spike.   HPI: A 39 yo male here for the initial visit for his DM1. He moved from [place] to [place] 10/2016, now starts to establish care in [place]. He was seen at [facility], Dr. Mario, last seen in 9/2016.    He has DM1 since age 8, at  that time he had polyuria, glucose was 750 during the baseball game. Insulin pump (Medtronics) was started 15 years ago.    Update: he met mohit and andrey and he was started on new insulin pump 630G, which he feels satisfied. Compared to previous time, he started to feel that he bolus more properly than just keep pushing manual bolus. Again, concern and insertion site and he thinks based on the site, insulin absorption can be delayed and sometimes takes 2 hours to start to work after bolus.     He also think basal some point seems too much, but he is not sure what point. There is several episodes of glucose 70s 60s later afternoon, but looks after multiple bolus, and he does not want to change today, rather he wants to download by himself and wants to accumulate more data and want to report us.        Ref. Range 5/10/2017 00:00 11/3/2017 16:15 11/28/2018 00:00   Hemoglobin A1C Latest Ref Range: 4.3 - 6.0 %  7.5 (H)    Hemoglobin A1C Latest Ref Range: 4.3 - 6.0 % 7.9 (A)  7.9 (A)       Current regimnes:   MEdtronic 630G insulin pump     Target 100-140  Sentivity 25  Active insulin tme: 4 hour    Average glucose 193    Basal new from 2/3/2020  0:00    1.85  3:00    1.7  6:00     1.85  8:00    2.00  11:30   2.30 (new, was 2.0)  16:30  2.00  22:30  1.80    Bolus new from 2/3/2020  0:00 6.0 (was 7.0)      Life style:  Wake up 6am  Breakfast: skip, coffee only  Lunch: sandwich (70-90g)  No much snacks  Dinner: (70-90g carb)      DM complications:  Retinopathy: 7/2016 early retinopathy, need to go.   Nephropathy: NEGATIVE (11/2017) due today  Neuropathy: no  Most recent LDL: 11/2017  60 (on lipitor)  TSH 0.93 (11/2017)    Continues glucose monitoring: We downloaded CGM and insulin pump and summarized here.  CGM: There is two trends of hyperglycemia in the morning 6-8 AM cortisol later afternoon spike 2-5 PM.  Average glucose 206   95    Basal: 67%, Bolus: 33%  Total daily insulin : 67 +- 9 units/day    Past  "Medical/Surgical History:  Past Medical History:   Diagnosis Date     Diabetes mellitus type 1 (H)     since age 8, medtronics pump,      History reviewed. No pertinent surgical history.    Allergies:  No Known Allergies    Current Medications   Current Outpatient Medications   Medication     atorvastatin (LIPITOR) 10 MG tablet     blood glucose monitoring (NO BRAND SPECIFIED) test strip     Continuous Blood Gluc  (DEXCOM G6 ) FELIX     Continuous Blood Gluc Sensor (DEXCOM G6 SENSOR) MISC     Continuous Blood Gluc Transmit (DEXCOM G6 TRANSMITTER) MISC     Glucose Blood (BLOOD GLUCOSE TEST STRIPS) STRP     insulin aspart (NOVOLOG VIAL) 100 UNITS/ML vial     insulin lispro (HUMALOG) 100 UNIT/ML injection     Lancets MISC     Multiple Vitamins-Minerals (MULTIVITAMIN ADULT PO)     VITAMIN D, CHOLECALCIFEROL, PO     No current facility-administered medications for this visit.        Family History:  History reviewed. No pertinent family history.  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      ROS:  Full review of systems taken with the help of the intake sheet. Otherwise a complete 14 point review of systems was taken and is negative unless stated in the history above.      Physical Exam:   Blood pressure 135/81, pulse 74, height 1.87 m (6' 1.62\"), weight 94.9 kg (209 lb 4.8 oz).  General: well appearing, no acute distress, pleasant and conversant,   Mental Status/neuro: alert and oriented  Face: symmetrical, normal facial color  Eyes: anicteric, PERRL, no proptosis or lid lag  Neck: suppler, no lymphadenopahty  Thyroid: normal size and texture, no nodule palpable, no bruits  Heart: regular rhythm, S1S2, no murmur appreciated  Lung: clear to " auscultation bilaterally  Abdomen: soft, NT/ND, hard 5-6 cm multiple tissues palpable by deep palpation, around umbilicus to the side  Legs: no swelling or edema  Feet: no deformities or ulcers, 2+ DP pulses, normal monofilament sensation

## 2020-02-03 NOTE — LETTER
2/3/2020       RE: Kyle Bermeo  [address] [place] [address]     Dear Colleague,    Thank you for referring your patient, Kyle Bermeo, to the [facility] ENDOCRINOLOGY at [facility]. Please see a copy of my visit note below.                             - Endocrinology Follow up -    Reason for visit/consult:  DM1 on insulin pump    Primary care provider: Confirmed, No PCP    Assessment and Plan  A 38 year old male with DM1 on insulin pump 630G, no DM complication, A1C 7.9,     # DM1  A1c 7.9 same since last year.  Hyperglycemia later afternoon with wide fluctuation.  We will change to setting the basal bolus today as follows.     Basal new from 2/3/2020  0:00    1.85  3:00    1.7  6:00     1.85  8:00    2.00  11:30   2.30 (new, was 2.0)  16:30  2.00  22:30  1.80    Bolus new from 2/3/2020  0:00 6.0 (was 7.0)    If breakfst and dinner low, then do (I gave option)  0:00 7.0  11:00 6.0  14:00 7.0     # Afternoon hyperglycemia  After lunch persistently 250-300s until before dinner.   Change made above.     # DM device  Patient want sot switch to Tandem/Dexcom    #Diabetes complications  No complications noticed,   Ophthalmology updated.  Screen for urine microalbumin CMP TSH free T4 and a fasting lipid.    RTC with me in 1 year    I spent 30 minutes with this patient face to face and explained the conditions and plans (more than 50% of time was counseling/coordination of care, discussed about injection sites, discussed about bolus) . The patient understood and is satisfied with today's visit. Return to clinic with me in 1 year.         Lilia Mendoza MD  Staff Physician  Endocrinology and Metabolism  License: [id number]    Interval History as of 2/3/2020 : Patient has been doing well. Last seen 14 month ago.  Interval History: This is third visit. Last visit was 1 year ago. Had a baby in his family 7/2018, busy and lack of sleep. Noticed mornign spikes (240s) without breakfast, and  also afternoon spike.   HPI: A 39 yo male here for the initial visit for his DM1. He moved from [place] to [place] 10/2016, now starts to establish care in [place]. He was seen at [facility], Dr. Mario, last seen in 9/2016.    He has DM1 since age 8, at that time he had polyuria, glucose was 750 during the baseball game. Insulin pump (Medtronics) was started 15 years ago.    Update: he met mohit and andrey and he was started on new insulin pump 630G, which he feels satisfied. Compared to previous time, he started to feel that he bolus more properly than just keep pushing manual bolus. Again, concern and insertion site and he thinks based on the site, insulin absorption can be delayed and sometimes takes 2 hours to start to work after bolus.     He also think basal some point seems too much, but he is not sure what point. There is several episodes of glucose 70s 60s later afternoon, but looks after multiple bolus, and he does not want to change today, rather he wants to download by himself and wants to accumulate more data and want to report us.        Ref. Range 5/10/2017 00:00 11/3/2017 16:15 11/28/2018 00:00   Hemoglobin A1C Latest Ref Range: 4.3 - 6.0 %  7.5 (H)    Hemoglobin A1C Latest Ref Range: 4.3 - 6.0 % 7.9 (A)  7.9 (A)       Current regimnes:   MEdtronic 630G insulin pump     Target 100-140  Sentivity 25  Active insulin tme: 4 hour    Average glucose 193    Basal new from 2/3/2020  0:00    1.85  3:00    1.7  6:00     1.85  8:00    2.00  11:30   2.30 (new, was 2.0)  16:30  2.00  22:30  1.80    Bolus new from 2/3/2020  0:00 6.0 (was 7.0)      Life style:  Wake up 6am  Breakfast: skip, coffee only  Lunch: sandwich (70-90g)  No much snacks  Dinner: (70-90g carb)      DM complications:  Retinopathy: 7/2016 early retinopathy, need to go.   Nephropathy: NEGATIVE (11/2017) due today  Neuropathy: no  Most recent LDL: 11/2017  60 (on lipitor)  TSH 0.93 (11/2017)    Continues glucose monitoring: We  "downloaded CGM and insulin pump and summarized here.  CGM: There is two trends of hyperglycemia in the morning 6-8 AM cortisol later afternoon spike 2-5 PM.  Average glucose 206    95    Basal: 67%, Bolus: 33%  Total daily insulin : 67 +- 9 units/day    Past Medical/Surgical History:  Past Medical History:   Diagnosis Date     Diabetes mellitus type 1 (H)     since age 8, medtronics pump,      History reviewed. No pertinent surgical history.    Allergies:  No Known Allergies    Current Medications   Current Outpatient Medications   Medication     atorvastatin (LIPITOR) 10 MG tablet     blood glucose monitoring (NO BRAND SPECIFIED) test strip     Continuous Blood Gluc  (DEXCOM G6 ) FELIX     Continuous Blood Gluc Sensor (DEXCOM G6 SENSOR) MISC     Continuous Blood Gluc Transmit (DEXCOM G6 TRANSMITTER) MISC     Glucose Blood (BLOOD GLUCOSE TEST STRIPS) STRP     insulin aspart (NOVOLOG VIAL) 100 UNITS/ML vial     insulin lispro (HUMALOG) 100 UNIT/ML injection     Lancets MISC     Multiple Vitamins-Minerals (MULTIVITAMIN ADULT PO)     VITAMIN D, CHOLECALCIFEROL, PO     No current facility-administered medications for this visit.        Family History:  History reviewed. No pertinent family history.  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      ROS:  Full review of systems taken with the help of the intake sheet. Otherwise a complete 14 point review of systems was taken and is negative unless stated in the history above.      Physical Exam:   Blood pressure 135/81, pulse 74, height 1.87 m (6' 1.62\"), weight 94.9 kg (209 lb 4.8 oz).  General: well appearing, no acute distress, pleasant and conversant,   Mental Status/neuro: alert and " oriented  Face: symmetrical, normal facial color  Eyes: anicteric, PERRL, no proptosis or lid lag  Neck: suppler, no lymphadenopahty  Thyroid: normal size and texture, no nodule palpable, no bruits  Heart: regular rhythm, S1S2, no murmur appreciated  Lung: clear to auscultation bilaterally  Abdomen: soft, NT/ND, hard 5-6 cm multiple tissues palpable by deep palpation, around umbilicus to the side  Legs: no swelling or edema  Feet: no deformities or ulcers, 2+ DP pulses, normal monofilament sensation        Again, thank you for allowing me to participate in the care of your patient.      Sincerely,    Lilia Mendoza MD

## 2020-02-16 ENCOUNTER — VIRTUAL VISIT (OUTPATIENT)
Dept: FAMILY MEDICINE | Facility: OTHER | Age: 40
End: 2020-02-16

## 2020-02-16 NOTE — PROGRESS NOTES
"Date: 2020 09:13:58  Clinician: Daniel Jorge  Clinician NPI: [id number]  Patient: Taj Bermeo  Patient : 1980  Patient Address[facility] [place] [address]  Patient Phone: [phone]  Visit Protocol: URI  Patient Summary:  Taj is a 40 year old ( : 1980 ) male who initiated a Visit for cold, sinus infection, or influenza. When asked the question \"Please sign me up to receive news, health information and promotions. \", Taj responded \"No\".    Taj states his symptoms started gradually 3-6 days ago.   His symptoms consist of a cough, facial pain or pressure, malaise, nasal congestion, and rhinitis. He is experiencing difficulty breathing due to nasal congestion but he is not short of breath.   Symptom details     Nasal secretions: The color of his mucus is green and yellow.    Cough: Taj coughs a few times an hour and his cough is not more bothersome at night. Phlegm comes into his throat when he coughs. He believes the phlegm causes the cough. The color of the phlegm is green and yellow.     Facial pain or pressure: The facial pain or pressure does not feel worse when bending or leaning forward.      Taj denies having myalgias, headache, sore throat, ear pain, enlarged lymph nodes, chills, fever, wheezing, and teeth pain. He also denies having recent facial or sinus surgery in the past 60 days, double sickening (worsening symptoms after initial improvement), and taking antibiotic medication for the symptoms.   Precipitating events  He has not recently been exposed to someone with influenza. Taj has been in close contact with the following high risk individuals: children under the age of 5.   Taj has not traveled internationally in the last 14 days before the start of his symptoms.   Pertinent medical history  Taj had 1 sinus infection within the past year.   Weight: 202 lbs   Taj does not smoke or use smokeless tobacco.   Weight: 202 lbs    MEDICATIONS: Novolog " U-100 Insulin aspart subcutaneous, atorvastatin oral, ALLERGIES: NKDA  Clinician Response:  Dear Taj,  Based on the information provided, you have a viral upper respiratory infection, otherwise known as a cold. Symptoms vary from person to person, but can include sneezing, coughing, a runny nose, sore throat, and headache and range from mild to severe.  Unfortunately, there are no medications that can cure a cold, so treatment is focused on controlling symptoms as much as possible. Most people gradually feel better until symptoms are gone in 1-2 weeks.  Based on the information provided, you have viral sinusitis, also known as a sinus infection. Sinus infections are caused by bacteria or a virus and symptoms are almost always identical. The difference between the 2 types of infections is timing.  Sinus infections start as viral infections and symptoms improve on their own in about 7 days. If symptoms have not improved after 7 days or have even worsened, a bacterial infection may have developed.  Medication information  Because you have a viral infection, antibiotics will not help you get better. Treating a viral infection with antibiotics could actually make you feel worse.  I am prescribing:     Fluticasone 50 mcg/actuation nasal spray. Inhale 2 sprays in each nostril 1 time per day; after 1 week, may adjust to 1 - 2 sprays in each nostril 1 time per day. This medication takes several days to start working, so keep taking it even if it doesn't help right away. There are no refills with this prescription.   Unless you are allergic to the over-the-counter medication(s) below, I recommend using:     A decongestant such as Sudafed PE or store brand.      Guaifenesin + dextromethorphan (Robitussin DM, Mucinex DM, or store brand).    A sinus irrigation kit such as Sinus Rinse, Neti Pot, SinuCleanse, or store brand. Be sure to use sterile or previously boiled water to prevent unwanted infections.     Over-the-counter  medications do not require a prescription. Ask the pharmacist if you have any questions.  Self care  The following tips will keep you as comfortable as possible while you recover:     Rest    Drink plenty of water and other liquids    Take a hot shower to loosen congestion    Take a spoonful of honey to reduce your cough     When to seek care  Please be seen in a clinic or urgent care if new symptoms develop, or symptoms become worse.   Diagnosis: Viral URI  Diagnosis ICD: J06.9  Prescription: fluticasone 50 mcg/actuation nasal spray,suspension 1 120 spray aerosol with adapter (grams), 30 days supply. Inhale 2 sprays in each nostril 1 time per day; after 1 week, may adjust to 1 - 2 sprays in each nostril 1 time per day.. Refills: 0, Refill as needed: no, Allow substitutions: yes

## 2020-03-10 ENCOUNTER — HEALTH MAINTENANCE LETTER (OUTPATIENT)
Age: 40
End: 2020-03-10

## 2020-03-23 ENCOUNTER — MEDICAL CORRESPONDENCE (OUTPATIENT)
Dept: HEALTH INFORMATION MANAGEMENT | Facility: CLINIC | Age: 40
End: 2020-03-23

## 2020-05-19 ENCOUNTER — MYC REFILL (OUTPATIENT)
Dept: ENDOCRINOLOGY | Facility: CLINIC | Age: 40
End: 2020-05-19

## 2020-05-19 DIAGNOSIS — E10.9 TYPE 1 DIABETES MELLITUS WITHOUT COMPLICATION (H): ICD-10-CM

## 2020-05-19 RX ORDER — ATORVASTATIN CALCIUM 10 MG/1
TABLET, FILM COATED ORAL
Qty: 90 TABLET | Refills: 3 | Status: SHIPPED | OUTPATIENT
Start: 2020-05-19 | End: 2021-11-22

## 2020-06-17 ENCOUNTER — TELEPHONE (OUTPATIENT)
Dept: ENDOCRINOLOGY | Facility: CLINIC | Age: 40
End: 2020-06-17

## 2020-06-17 NOTE — TELEPHONE ENCOUNTER
Rx faxed to [facility] @ [phone] for refills on sensor .     Miranda Cheek RN, BSN, CDE   [facility]

## 2020-09-01 ENCOUNTER — CARE COORDINATION (OUTPATIENT)
Dept: EDUCATION SERVICES | Facility: CLINIC | Age: 40
End: 2020-09-01

## 2020-09-01 DIAGNOSIS — E10.9 TYPE 1 DIABETES MELLITUS WITHOUT COMPLICATION (H): ICD-10-CM

## 2020-09-01 RX ORDER — PROCHLORPERAZINE 25 MG/1
1 SUPPOSITORY RECTAL
Qty: 1 EACH | Refills: 3 | Status: SHIPPED | OUTPATIENT
Start: 2020-09-01 | End: 2022-03-09

## 2020-09-01 RX ORDER — INSULIN INFUSION SET/CARTRIDGE
1 COMBINATION PACKAGE (EA) MISCELLANEOUS EVERY OTHER DAY
Qty: 40 EACH | Refills: 3 | Status: SHIPPED | OUTPATIENT
Start: 2020-09-01 | End: 2020-09-02

## 2020-09-01 RX ORDER — PROCHLORPERAZINE 25 MG/1
1 SUPPOSITORY RECTAL
Qty: 9 EACH | Refills: 3 | Status: SHIPPED | OUTPATIENT
Start: 2020-09-01 | End: 2022-03-09

## 2020-09-02 ENCOUNTER — CARE COORDINATION (OUTPATIENT)
Dept: EDUCATION SERVICES | Facility: CLINIC | Age: 40
End: 2020-09-02

## 2020-09-02 DIAGNOSIS — E10.9 TYPE 1 DIABETES MELLITUS WITHOUT COMPLICATION (H): ICD-10-CM

## 2020-09-02 RX ORDER — INSULIN INFUSION SET/CARTRIDGE
1 COMBINATION PACKAGE (EA) MISCELLANEOUS EVERY OTHER DAY
Qty: 40 EACH | Refills: 3 | Status: SHIPPED | OUTPATIENT
Start: 2020-09-02 | End: 2022-03-09

## 2020-12-27 ENCOUNTER — HEALTH MAINTENANCE LETTER (OUTPATIENT)
Age: 40
End: 2020-12-27

## 2021-02-24 ENCOUNTER — DOCUMENTATION ONLY (OUTPATIENT)
Dept: ENDOCRINOLOGY | Facility: CLINIC | Age: 41
End: 2021-02-24

## 2021-02-25 ENCOUNTER — TELEPHONE (OUTPATIENT)
Dept: ENDOCRINOLOGY | Facility: CLINIC | Age: 41
End: 2021-02-25

## 2021-02-25 NOTE — TELEPHONE ENCOUNTER
Received signed DMV form via email from provider. Forwarded to Navigator team. Faxed to DMV x2. Original mailed to PT via [employer]. Copy labeled to scan. Copy on file, per Char.   Patti Cook RN on 2/25/2021 at 10:15 AM

## 2021-04-12 ENCOUNTER — OFFICE VISIT (OUTPATIENT)
Dept: ENDOCRINOLOGY | Facility: CLINIC | Age: 41
End: 2021-04-12
Payer: COMMERCIAL

## 2021-04-12 VITALS
WEIGHT: 213.3 LBS | BODY MASS INDEX: 27.37 KG/M2 | HEART RATE: 80 BPM | SYSTOLIC BLOOD PRESSURE: 152 MMHG | DIASTOLIC BLOOD PRESSURE: 89 MMHG | HEIGHT: 74 IN

## 2021-04-12 DIAGNOSIS — E10.9 TYPE 1 DIABETES MELLITUS WITHOUT COMPLICATION (H): Primary | ICD-10-CM

## 2021-04-12 DIAGNOSIS — Z46.81 INSULIN PUMP TITRATION: ICD-10-CM

## 2021-04-12 DIAGNOSIS — E10.9 TYPE 1 DIABETES MELLITUS WITHOUT COMPLICATION (H): ICD-10-CM

## 2021-04-12 LAB
ALBUMIN SERPL-MCNC: 4.1 G/DL (ref 3.4–5)
ALP SERPL-CCNC: 60 U/L (ref 40–150)
ALT SERPL W P-5'-P-CCNC: 33 U/L (ref 0–70)
ANION GAP SERPL CALCULATED.3IONS-SCNC: 2 MMOL/L (ref 3–14)
AST SERPL W P-5'-P-CCNC: 11 U/L (ref 0–45)
BILIRUB SERPL-MCNC: 0.5 MG/DL (ref 0.2–1.3)
BUN SERPL-MCNC: 9 MG/DL (ref 7–30)
CALCIUM SERPL-MCNC: 9.2 MG/DL (ref 8.5–10.1)
CHLORIDE SERPL-SCNC: 110 MMOL/L (ref 94–109)
CHOLEST SERPL-MCNC: 140 MG/DL
CO2 SERPL-SCNC: 29 MMOL/L (ref 20–32)
CREAT SERPL-MCNC: 0.94 MG/DL (ref 0.66–1.25)
CREAT UR-MCNC: 23 MG/DL
GFR SERPL CREATININE-BSD FRML MDRD: >90 ML/MIN/{1.73_M2}
GLUCOSE SERPL-MCNC: 138 MG/DL (ref 70–99)
HBA1C MFR BLD: 6.6 % (ref 4.3–6)
HDLC SERPL-MCNC: 51 MG/DL
LDLC SERPL CALC-MCNC: 83 MG/DL
MICROALBUMIN UR-MCNC: <5 MG/L
MICROALBUMIN/CREAT UR: NORMAL MG/G CR (ref 0–17)
NONHDLC SERPL-MCNC: 89 MG/DL
POTASSIUM SERPL-SCNC: 4.7 MMOL/L (ref 3.4–5.3)
PROT SERPL-MCNC: 7.2 G/DL (ref 6.8–8.8)
SODIUM SERPL-SCNC: 141 MMOL/L (ref 133–144)
T4 FREE SERPL-MCNC: 0.88 NG/DL (ref 0.76–1.46)
TRIGL SERPL-MCNC: 28 MG/DL
TSH SERPL DL<=0.005 MIU/L-ACNC: 0.82 MU/L (ref 0.4–4)

## 2021-04-12 PROCEDURE — 84443 ASSAY THYROID STIM HORMONE: CPT | Performed by: PATHOLOGY

## 2021-04-12 PROCEDURE — 36415 COLL VENOUS BLD VENIPUNCTURE: CPT | Performed by: PATHOLOGY

## 2021-04-12 PROCEDURE — 84439 ASSAY OF FREE THYROXINE: CPT | Performed by: PATHOLOGY

## 2021-04-12 PROCEDURE — 80061 LIPID PANEL: CPT | Performed by: PATHOLOGY

## 2021-04-12 PROCEDURE — 99215 OFFICE O/P EST HI 40 MIN: CPT | Mod: 25 | Performed by: INTERNAL MEDICINE

## 2021-04-12 PROCEDURE — 95251 CONT GLUC MNTR ANALYSIS I&R: CPT | Performed by: INTERNAL MEDICINE

## 2021-04-12 PROCEDURE — 82043 UR ALBUMIN QUANTITATIVE: CPT | Performed by: PATHOLOGY

## 2021-04-12 PROCEDURE — 80053 COMPREHEN METABOLIC PANEL: CPT | Performed by: PATHOLOGY

## 2021-04-12 PROCEDURE — 83036 HEMOGLOBIN GLYCOSYLATED A1C: CPT | Performed by: INTERNAL MEDICINE

## 2021-04-12 ASSESSMENT — MIFFLIN-ST. JEOR: SCORE: 1939.33

## 2021-04-12 ASSESSMENT — PAIN SCALES - GENERAL: PAINLEVEL: NO PAIN (0)

## 2021-04-12 NOTE — LETTER
4/12/2021       RE: Kyle Bermeo  [address] [place] [address]     Dear Colleague,    Thank you for referring your patient, Kyle Bermeo, to the [facility] ENDOCRINOLOGY CLINIC [place] at [facility]. Please see a copy of my visit note below.                                   - Endocrinology Follow up -    Reason for visit/consult:  DM1 on insulin pump    Primary care provider: Confirmed, No PCP    Assessment and Plan  A 41 year old male with DM1 on insulin pump 630G, no DM complication, A1C 7.9,     # DM1  A1c 6.6  same since last year.  Hyperglycemia later afternoon with wide fluctuation.  We will change to setting the basal bolus today as follows.       Basal (this will help control IQ for the limit)  0:00    1.85  3:00    1.7  6:00     1.85  8:00    2.00  11:30   2.0 (back from 2.3)  16:30  2.00  22:30  1.80    Bolus 0:00 6.0     If breakfst and dinner low, then do (I gave option)  0:00 7.0  11:00 6.0  14:00 7.0     # Afternoon hyperglycemia  After lunch persistently 250-300s until before dinner.   Change made above.     # DM device  Patient want sot switch to Tandem/Dexcom    #Diabetes complications  No complications noticed,   Ophthalmology updated.    Ordered annual Screen for urine microalbumin CMP TSH free T4 and a fasting lipid.    RTC with me in 1 year    I spent 40 minutes with this patient face to face and explained the conditions and plans (more than 50% of time was counseling/coordination of care, discussed about injection sites, discussed about bolus) . The patient understood and is satisfied with today's visit. Return to clinic with me in 1 year.         Lilia Mendoza MD  Staff Physician  Endocrinology and Metabolism  License: [id number]    Interval History as of 4/12/2021 : Patient has been doing well. Annual follow up. earler afternoon hypoglycemia occasionally, with using control IQ.  Interval History as of 2/3/2020 : Patient has been doing  well. Last seen 14 month ago.  Interval History: This is third visit. Last visit was 1 year ago. Had a baby in his family 7/2018, busy and lack of sleep. Noticed mornign spikes (240s) without breakfast, and also afternoon spike.   HPI: A 39 yo male here for the initial visit for his DM1. He moved from [place] to [place] 10/2016, now starts to establish care in [place]. He was seen at [facility], Dr. Mario, last seen in 9/2016.    He has DM1 since age 8, at that time he had polyuria, glucose was 750 during the baseball game. Insulin pump (Medtronics) was started 15 years ago.    Update: he met mohit and andrey and he was started on new insulin pump 630G, which he feels satisfied. Compared to previous time, he started to feel that he bolus more properly than just keep pushing manual bolus. Again, concern and insertion site and he thinks based on the site, insulin absorption can be delayed and sometimes takes 2 hours to start to work after bolus.     He also think basal some point seems too much, but he is not sure what point. There is several episodes of glucose 70s 60s later afternoon, but looks after multiple bolus, and he does not want to change today, rather he wants to download by himself and wants to accumulate more data and want to report us.        Ref. Range 5/10/2017 00:00 11/3/2017 16:15 11/28/2018 00:00   Hemoglobin A1C Latest Ref Range: 4.3 - 6.0 %  7.5 (H)    Hemoglobin A1C Latest Ref Range: 4.3 - 6.0 % 7.9 (A)  7.9 (A)       Current regimnes:   MEdtronic 630G insulin pump     Target 100-140  Sentivity 25  Active insulin tme: 4 hour    Average glucose 193    Basal new from 2/3/2020  0:00    1.85  3:00    1.7  6:00     1.85  8:00    2.00  11:30   2.30 (new, was 2.0)  16:30  2.00  22:30  1.80    Bolus new from 2/3/2020  0:00 6.0 (was 7.0)      Life style:  Wake up 6am  Breakfast: skip, coffee only  Lunch: sandwich (70-90g)  No much snacks  Dinner: (70-90g carb)      DM  complications:  Retinopathy: 7/2016 early retinopathy, need to go.   Nephropathy: NEGATIVE (11/2017) due today  Neuropathy: no  Most recent LDL: 11/2017  60 (on lipitor)  TSH 0.93 (11/2017)    Continues glucose monitoring: We downloaded CGM and insulin pump and summarized here.  CGM: Average glucose 156    Basal: 69%, Bolus: 31%  Total daily insulin : 68.9 units/day    Past Medical/Surgical History:  Past Medical History:   Diagnosis Date     Diabetes mellitus type 1 (H)     since age 8, medtronics pump,      No past surgical history on file.    Allergies:  No Known Allergies    Current Medications   Current Outpatient Medications   Medication     atorvastatin (LIPITOR) 10 MG tablet     blood glucose (NO BRAND SPECIFIED) test strip     blood glucose monitoring (NO BRAND SPECIFIED) test strip     Continuous Blood Gluc  (DEXCOM G6 ) FELIX     Continuous Blood Gluc Sensor (DEXCOM G6 SENSOR) MISC     Continuous Blood Gluc Transmit (DEXCOM G6 TRANSMITTER) MISC     Glucose Blood (BLOOD GLUCOSE TEST STRIPS) STRP     insulin aspart (NOVOLOG VIAL) 100 UNITS/ML vial     insulin cartridge (T:SLIM 3ML) misc pump supply     Insulin Infusion Pump Supplies (AUTOSOFT XC INFUSION SET) MISC     Lancets MISC     Multiple Vitamins-Minerals (MULTIVITAMIN ADULT PO)     VITAMIN D, CHOLECALCIFEROL, PO     No current facility-administered medications for this visit.        Family History:  No family history on file.  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      ROS:  Full review of systems taken with the help of the intake sheet. Otherwise a complete 14 point review of systems was taken and is negative unless stated in the history above.      Physical Exam:    There were no vitals taken for this visit.  General: well appearing, no acute distress, pleasant and conversant,   Mental Status/neuro: alert and oriented  Face: symmetrical, normal facial color  Eyes: anicteric, PERRL, no proptosis or lid lag  Neck: suppler, no lymphadenopahty  Thyroid: normal size and texture, no nodule palpable, no bruits  Heart: regular rhythm, S1S2, no murmur appreciated  Lung: clear to auscultation bilaterally  Abdomen: soft, NT/ND, hard 5-6 cm multiple tissues palpable by deep palpation, around umbilicus to the side  Legs: no swelling or edema

## 2021-04-12 NOTE — PROGRESS NOTES
- Endocrinology Follow up -    Reason for visit/consult:  DM1 on insulin pump    Primary care provider: Confirmed, No PCP    Assessment and Plan  A 41 year old male with DM1 on insulin pump 630G, no DM complication, A1C 7.9,     # DM1  A1c 6.6  same since last year.  Hyperglycemia later afternoon with wide fluctuation.  We will change to setting the basal bolus today as follows.       Basal (this will help control IQ for the limit)  0:00    1.85  3:00    1.7  6:00     1.85  8:00    2.00  11:30   2.0 (back from 2.3)  16:30  2.00  22:30  1.80    Bolus 0:00 6.0     If breakfst and dinner low, then do (I gave option)  0:00 7.0  11:00 6.0  14:00 7.0     # Afternoon hyperglycemia  After lunch persistently 250-300s until before dinner.   Change made above.     # DM device  Patient want sot switch to Tandem/Dexcom    #Diabetes complications  No complications noticed,   Ophthalmology updated.    Ordered annual Screen for urine microalbumin CMP TSH free T4 and a fasting lipid.    RTC with me in 1 year    I spent 40 minutes with this patient face to face and explained the conditions and plans (more than 50% of time was counseling/coordination of care, discussed about injection sites, discussed about bolus) . The patient understood and is satisfied with today's visit. Return to clinic with me in 1 year.         Lilia Mendoza MD  Staff Physician  Endocrinology and Metabolism  License: [id number]    Interval History as of 4/12/2021 : Patient has been doing well. Annual follow up. earler afternoon hypoglycemia occasionally, with using control IQ.  Interval History as of 2/3/2020 : Patient has been doing well. Last seen 14 month ago.  Interval History: This is third visit. Last visit was 1 year ago. Had a baby in his family 7/2018, busy and lack of sleep. Noticed mornign spikes (240s) without breakfast, and also afternoon spike.   HPI: A 39 yo male here for the initial visit for his DM1. He moved from  [place] to [place] 10/2016, now starts to establish care in [place]. He was seen at [facility], Dr. Mario, last seen in 9/2016.    He has DM1 since age 8, at that time he had polyuria, glucose was 750 during the baseball game. Insulin pump (Medtronics) was started 15 years ago.    Update: he met mohit and andrey and he was started on new insulin pump 630G, which he feels satisfied. Compared to previous time, he started to feel that he bolus more properly than just keep pushing manual bolus. Again, concern and insertion site and he thinks based on the site, insulin absorption can be delayed and sometimes takes 2 hours to start to work after bolus.     He also think basal some point seems too much, but he is not sure what point. There is several episodes of glucose 70s 60s later afternoon, but looks after multiple bolus, and he does not want to change today, rather he wants to download by himself and wants to accumulate more data and want to report us.        Ref. Range 5/10/2017 00:00 11/3/2017 16:15 11/28/2018 00:00   Hemoglobin A1C Latest Ref Range: 4.3 - 6.0 %  7.5 (H)    Hemoglobin A1C Latest Ref Range: 4.3 - 6.0 % 7.9 (A)  7.9 (A)       Current regimnes:   MEdtronic 630G insulin pump     Target 100-140  Sentivity 25  Active insulin tme: 4 hour    Average glucose 193    Basal new from 2/3/2020  0:00    1.85  3:00    1.7  6:00     1.85  8:00    2.00  11:30   2.30 (new, was 2.0)  16:30  2.00  22:30  1.80    Bolus new from 2/3/2020  0:00 6.0 (was 7.0)      Life style:  Wake up 6am  Breakfast: skip, coffee only  Lunch: sandwich (70-90g)  No much snacks  Dinner: (70-90g carb)      DM complications:  Retinopathy: 7/2016 early retinopathy, need to go.   Nephropathy: NEGATIVE (11/2017) due today  Neuropathy: no  Most recent LDL: 11/2017  60 (on lipitor)  TSH 0.93 (11/2017)    Continues glucose monitoring: We downloaded CGM and insulin pump and summarized here.  CGM: Average glucose 156    Basal: 69%,  Bolus: 31%  Total daily insulin : 68.9 units/day    Past Medical/Surgical History:  Past Medical History:   Diagnosis Date     Diabetes mellitus type 1 (H)     since age 8, medtronics pump,      No past surgical history on file.    Allergies:  No Known Allergies    Current Medications   Current Outpatient Medications   Medication     atorvastatin (LIPITOR) 10 MG tablet     blood glucose (NO BRAND SPECIFIED) test strip     blood glucose monitoring (NO BRAND SPECIFIED) test strip     Continuous Blood Gluc  (DEXCOM G6 ) FELIX     Continuous Blood Gluc Sensor (DEXCOM G6 SENSOR) MISC     Continuous Blood Gluc Transmit (DEXCOM G6 TRANSMITTER) MISC     Glucose Blood (BLOOD GLUCOSE TEST STRIPS) STRP     insulin aspart (NOVOLOG VIAL) 100 UNITS/ML vial     insulin cartridge (T:SLIM 3ML) misc pump supply     Insulin Infusion Pump Supplies (AUTOSOFT XC INFUSION SET) MISC     Lancets MISC     Multiple Vitamins-Minerals (MULTIVITAMIN ADULT PO)     VITAMIN D, CHOLECALCIFEROL, PO     No current facility-administered medications for this visit.        Family History:  No family history on file.  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      ROS:  Full review of systems taken with the help of the intake sheet. Otherwise a complete 14 point review of systems was taken and is negative unless stated in the history above.      Physical Exam:   There were no vitals taken for this visit.  General: well appearing, no acute distress, pleasant and conversant,   Mental Status/neuro: alert and oriented  Face: symmetrical, normal facial color  Eyes: anicteric, PERRL, no proptosis or lid lag  Neck: suppler, no lymphadenopahty  Thyroid: normal size and texture, no  nodule palpable, no bruits  Heart: regular rhythm, S1S2, no murmur appreciated  Lung: clear to auscultation bilaterally  Abdomen: soft, NT/ND, hard 5-6 cm multiple tissues palpable by deep palpation, around umbilicus to the side  Legs: no swelling or edema

## 2021-04-20 ENCOUNTER — TELEPHONE (OUTPATIENT)
Dept: ENDOCRINOLOGY | Facility: CLINIC | Age: 41
End: 2021-04-20

## 2021-04-20 ENCOUNTER — MEDICAL CORRESPONDENCE (OUTPATIENT)
Dept: HEALTH INFORMATION MANAGEMENT | Facility: CLINIC | Age: 41
End: 2021-04-20

## 2021-04-20 NOTE — TELEPHONE ENCOUNTER
Rx order form + chart notes and labs faxed to [facility] @ [phone].    Miranda Cheek, RN, BSN, CDE   [facility]   [Sore Throat] : sore throat [Severe] : severe [Gradual] : gradually [Congestion] : congestion [OTC Remedies] : OTC remedies [At Night] : at night [In Morning] : in the morning [Worsening] : worsening [___ Days ago] : [unfilled] days ago [Constant] : constant [Wheezing] : wheezing [Cough] : cough [Chills] : no chills [Anorexia] : no anorexia [Earache] : no earache [Shortness Of Breath] : no shortness of breath [Fatigue] : not fatigue [Headache] : no headache [Fever] : no fever [de-identified] : sinus congestion [de-identified] : r [FreeTextEntry5] : nyquil as directed, and dayquil as directed [FreeTextEntry8] : This is a 45y/o  male here today for c/o 7/10 sore throat and sinus congestion, SLIDTA as discussed above.\par reports having been seen by Dr. STIVEN Laboy in this office 14 days ago for similar symptoms and was prescribed/directed treatment for symptoms. patient reports taking medications as directed. 5 days later patient reports having felt better but then symptoms worsened five days after that. symptoms having now persisted for 14 days in total. denies having received the flu vaccine this season. denies having had sick contacts for flu. \par o/w denies other major accidents, illnesses, injuries, or hospitalizations in this last year.

## 2021-04-24 ENCOUNTER — HEALTH MAINTENANCE LETTER (OUTPATIENT)
Age: 41
End: 2021-04-24

## 2021-05-18 DIAGNOSIS — E10.9 DIABETES MELLITUS TYPE 1 (H): Primary | ICD-10-CM

## 2021-05-19 NOTE — TELEPHONE ENCOUNTER
insulin aspart (NOVOLOG VIAL) 100 UNITS/ML vial  Last Written Prescription Date:  2/3/20  Last Fill Quantity: 90ml,   # refills: 11  Last Office Visit : 4/12/21  Future Office visit:  none    Routing refill request to provider for review/approval because: endo triage to fill

## 2021-06-23 ENCOUNTER — TELEPHONE (OUTPATIENT)
Dept: ENDOCRINOLOGY | Facility: CLINIC | Age: 41
End: 2021-06-23

## 2021-06-23 ENCOUNTER — MEDICAL CORRESPONDENCE (OUTPATIENT)
Dept: HEALTH INFORMATION MANAGEMENT | Facility: CLINIC | Age: 41
End: 2021-06-23

## 2021-06-23 NOTE — TELEPHONE ENCOUNTER
Rx for sensor supplies faxes to [facility] @ [phone].    Miranda Cheek RN, BSN, CDE   [facility]

## 2021-10-09 ENCOUNTER — HEALTH MAINTENANCE LETTER (OUTPATIENT)
Age: 41
End: 2021-10-09

## 2021-11-19 DIAGNOSIS — E10.9 TYPE 1 DIABETES MELLITUS WITHOUT COMPLICATION (H): ICD-10-CM

## 2021-11-22 DIAGNOSIS — E10.9 TYPE 1 DIABETES MELLITUS WITHOUT COMPLICATION (H): ICD-10-CM

## 2021-11-22 RX ORDER — ATORVASTATIN CALCIUM 10 MG/1
10 TABLET, FILM COATED ORAL DAILY
Qty: 90 TABLET | Refills: 1 | Status: SHIPPED | OUTPATIENT
Start: 2021-11-22 | End: 2022-03-09

## 2021-11-22 RX ORDER — ATORVASTATIN CALCIUM 10 MG/1
TABLET, FILM COATED ORAL
Qty: 90 TABLET | Refills: 3 | OUTPATIENT
Start: 2021-11-22

## 2021-11-22 NOTE — TELEPHONE ENCOUNTER
Last Clinic Visit: 4/12/2021 [facility] Endocrinology Clinic [place]

## 2021-12-04 ENCOUNTER — HEALTH MAINTENANCE LETTER (OUTPATIENT)
Age: 41
End: 2021-12-04

## 2021-12-08 ENCOUNTER — TRANSFERRED RECORDS (OUTPATIENT)
Dept: HEALTH INFORMATION MANAGEMENT | Facility: CLINIC | Age: 41
End: 2021-12-08
Payer: COMMERCIAL

## 2021-12-08 LAB — RETINOPATHY: POSITIVE

## 2021-12-09 ENCOUNTER — DOCUMENTATION ONLY (OUTPATIENT)
Dept: ENDOCRINOLOGY | Facility: CLINIC | Age: 41
End: 2021-12-09
Payer: COMMERCIAL

## 2021-12-09 NOTE — PROGRESS NOTES
Diabetic eye exam completed 12/8/21 from [facility] received.    Eulalio Dougherty [facility]  Adult Endocrinology  [facility]

## 2022-02-22 ENCOUNTER — OFFICE VISIT (OUTPATIENT)
Dept: INTERNAL MEDICINE | Facility: CLINIC | Age: 42
End: 2022-02-22
Payer: COMMERCIAL

## 2022-02-22 ENCOUNTER — ANCILLARY PROCEDURE (OUTPATIENT)
Dept: GENERAL RADIOLOGY | Facility: CLINIC | Age: 42
End: 2022-02-22
Attending: PHYSICIAN ASSISTANT
Payer: COMMERCIAL

## 2022-02-22 VITALS
TEMPERATURE: 98.1 F | OXYGEN SATURATION: 97 % | RESPIRATION RATE: 18 BRPM | SYSTOLIC BLOOD PRESSURE: 116 MMHG | HEART RATE: 90 BPM | DIASTOLIC BLOOD PRESSURE: 72 MMHG

## 2022-02-22 DIAGNOSIS — Z20.822 SUSPECTED 2019 NOVEL CORONAVIRUS INFECTION: ICD-10-CM

## 2022-02-22 DIAGNOSIS — R05.9 COUGH: Primary | ICD-10-CM

## 2022-02-22 DIAGNOSIS — J06.9 UPPER RESPIRATORY TRACT INFECTION, UNSPECIFIED TYPE: ICD-10-CM

## 2022-02-22 PROCEDURE — 71046 X-RAY EXAM CHEST 2 VIEWS: CPT | Performed by: RADIOLOGY

## 2022-02-22 PROCEDURE — U0003 INFECTIOUS AGENT DETECTION BY NUCLEIC ACID (DNA OR RNA); SEVERE ACUTE RESPIRATORY SYNDROME CORONAVIRUS 2 (SARS-COV-2) (CORONAVIRUS DISEASE [COVID-19]), AMPLIFIED PROBE TECHNIQUE, MAKING USE OF HIGH THROUGHPUT TECHNOLOGIES AS DESCRIBED BY CMS-2020-01-R: HCPCS | Performed by: PHYSICIAN ASSISTANT

## 2022-02-22 PROCEDURE — 99203 OFFICE O/P NEW LOW 30 MIN: CPT | Performed by: PHYSICIAN ASSISTANT

## 2022-02-22 PROCEDURE — U0005 INFEC AGEN DETEC AMPLI PROBE: HCPCS | Performed by: PHYSICIAN ASSISTANT

## 2022-02-22 RX ORDER — PREDNISONE 20 MG/1
20 TABLET ORAL DAILY
Qty: 5 TABLET | Refills: 0 | Status: SHIPPED | OUTPATIENT
Start: 2022-02-22 | End: 2022-03-31

## 2022-02-22 RX ORDER — CODEINE PHOSPHATE AND GUAIFENESIN 10; 100 MG/5ML; MG/5ML
1-2 SOLUTION ORAL EVERY 6 HOURS PRN
Qty: 180 ML | Refills: 0 | Status: SHIPPED | OUTPATIENT
Start: 2022-02-22 | End: 2022-03-31

## 2022-02-22 NOTE — PROGRESS NOTES
"  Assessment & Plan     Cough    - Symptomatic; Yes; 2/8/2022 COVID-19 Virus (Coronavirus) by PCR Nose  - XR Chest 2 Views  - guaiFENesin-codeine (ROBITUSSIN AC) 100-10 MG/5ML solution; Take 5-10 mLs by mouth every 6 hours as needed for cough    Suspected 2019 novel coronavirus infection      Upper respiratory tract infection, unspecified type    - XR Chest 2 Views  - guaiFENesin-codeine (ROBITUSSIN AC) 100-10 MG/5ML solution; Take 5-10 mLs by mouth every 6 hours as needed for cough  - predniSONE (DELTASONE) 20 MG tablet; Take 1 tablet (20 mg) by mouth daily      I spent a total of 30 minutes on the day of the visit.   Time spent doing chart review, history and exam, documentation and further activities per the note       BMI:   Estimated body mass index is 27.76 kg/m  as calculated from the following:    Height as of 4/12/21: 1.867 m (6' 1.5\").    Weight as of 4/12/21: 96.8 kg (213 lb 4.8 oz).       Chest xray is negative for pneumonia- normal chest xray  Medications to help with coughing as above  Covid test pending  Fluids   Rest  Reviewed steroids with effect blood sugars - has insulin pump        Return in about 2 weeks (around 3/8/2022) for recheck if not improving, regular primary provider.    BRAULIO Deleon [facility]    Clarisa Wright is a 42 year old who presents for the following health issues  accompanied by his spouse.    HPI     Acute Illness  Acute illness concerns: Deep cough  Onset/Duration: about 2 weeks  Symptoms:  Fever: YES  Chills/Sweats: YES  Headache (location?): YES  Sinus Pressure: no  Conjunctivitis:  no  Ear Pain: no  Rhinorrhea: YES  Congestion: YES  Sore Throat: YES  Cough: YES-productive of yellow sputum  Wheeze: no  Decreased Appetite: no  Nausea: no  Vomiting: no  Diarrhea: no  Dysuria/Freq.: no  Dysuria or Hematuria: no  Fatigue/Achiness: YES  SOB YES  No rash   No loss of taste or smell  Sick/Strep Exposure: no  Therapies tried and " outcome: None    Took home test about 4-5 days after symptoms - Rapid Test at home.     One week ago -   Doxycycline- still on antibiotic  Albuterol    Two days ago - albuterol inhaler.             Review of Systems   Constitutional, HEENT, cardiovascular, pulmonary, gi and gu systems are negative, except as otherwise noted.      Objective    /72   Pulse 90   Temp 98.1  F (36.7  C) (Tympanic)   Resp 18   SpO2 97%   There is no height or weight on file to calculate BMI.  Physical Exam   GENERAL: healthy, alert and no distress  HENT: ear canals and TM's normal, nose and mouth without ulcers or lesions  NECK: no adenopathy, no asymmetry, masses, or scars and thyroid normal to palpation  RESP: lungs clear to auscultation - no rales, rhonchi or wheezes  CV: regular rates and rhythm and normal S1 S2, no S3 or S4  MS: no gross musculoskeletal defects noted, no edema  SKIN: no suspicious lesions or rashes    Results for orders placed or performed in visit on 02/22/22   XR Chest 2 Views    Impression    IMPRESSION: Negative chest.    WILL LONGORIA MD         SYSTEM ID:  [id number]

## 2022-02-23 LAB — SARS-COV-2 RNA RESP QL NAA+PROBE: NEGATIVE

## 2022-03-09 DIAGNOSIS — E10.9 TYPE 1 DIABETES MELLITUS WITHOUT COMPLICATION (H): ICD-10-CM

## 2022-03-09 DIAGNOSIS — E10.9 DIABETES MELLITUS TYPE 1 (H): ICD-10-CM

## 2022-03-09 RX ORDER — INSULIN INFUSION SET/CARTRIDGE
1 COMBINATION PACKAGE (EA) MISCELLANEOUS EVERY OTHER DAY
Qty: 40 EACH | Refills: 1 | Status: SHIPPED | OUTPATIENT
Start: 2022-03-09

## 2022-03-09 RX ORDER — PROCHLORPERAZINE 25 MG/1
1 SUPPOSITORY RECTAL
Qty: 9 EACH | Refills: 1 | Status: SHIPPED | OUTPATIENT
Start: 2022-03-09 | End: 2022-09-23

## 2022-03-09 RX ORDER — PROCHLORPERAZINE 25 MG/1
1 SUPPOSITORY RECTAL
Qty: 1 EACH | Refills: 1 | Status: SHIPPED | OUTPATIENT
Start: 2022-03-09 | End: 2022-09-23

## 2022-03-09 RX ORDER — ATORVASTATIN CALCIUM 10 MG/1
10 TABLET, FILM COATED ORAL DAILY
Qty: 90 TABLET | Refills: 1 | Status: SHIPPED | OUTPATIENT
Start: 2022-03-09 | End: 2022-08-16

## 2022-03-10 ENCOUNTER — TELEPHONE (OUTPATIENT)
Dept: ENDOCRINOLOGY | Facility: CLINIC | Age: 42
End: 2022-03-10

## 2022-03-10 NOTE — TELEPHONE ENCOUNTER
Questions set received. Placed in P folder for completion.     Prior Authorization Retail Medication Request    Medication/Dose: Continuous Blood Gluc Sensor (DEXCOM G6 SENSOR) MISC  ICD code (if different than what is on RX): Type 1 diabetes mellitus without complication (H) [E10.9]   Previously Tried and Failed:    Rationale:      Insurance Name:  [employer]  Insurance ID: [id number]    Pharmacy Information (if different than what is on RX)  Name:  [facility] - [address]  Phone:  [phone]

## 2022-03-16 NOTE — TELEPHONE ENCOUNTER
Central Prior Authorization Team   Phone: [phone]      PA Initiation-Initiated by patient.    Medication: Continuous Blood Gluc Sensor (DEXCOM G6 SENSOR) MISC-PA initiated  Insurance Company: [employer] - Phone [phone] Fax [phone]  Pharmacy Filling the Rx: [facility] - [address]  Filling Pharmacy Phone: [phone]  Filling Pharmacy Fax:    Start Date: 3/16/2022

## 2022-03-18 DIAGNOSIS — E10.9 DIABETES MELLITUS TYPE 1 (H): ICD-10-CM

## 2022-03-18 NOTE — TELEPHONE ENCOUNTER
insulin lispro (HUMALOG) 100 UNIT/ML injection - Discontinued      Last Written Prescription Date:  11/30/17  Last Fill Quantity: 90mL,   # refills: 3  Last Office Visit : 04/12/21  Future Office Visit:  None scheduled    Routing refill request to provider for review/approval because:  Insulin - refilled per clinic

## 2022-03-21 ENCOUNTER — TELEPHONE (OUTPATIENT)
Dept: ENDOCRINOLOGY | Facility: CLINIC | Age: 42
End: 2022-03-21
Payer: COMMERCIAL

## 2022-03-21 DIAGNOSIS — E10.9 TYPE 1 DIABETES MELLITUS WITHOUT COMPLICATION (H): Primary | ICD-10-CM

## 2022-03-21 NOTE — TELEPHONE ENCOUNTER
[facility] Call Center    Phone Message    May a detailed message be left on voicemail: yes     Reason for Call: Order(s): Other:   Reason for requested: Lab orders  Date needed: This week  Provider name: Dr. Mendoza    Pt called to get scheduled with Dr. Mendoza, he would like lab orders placed in his chart before his May appt, please call pt when they are in his chart.          Action Taken: Message routed to:  [facility] Endo    Travel Screening: Not Applicable

## 2022-03-22 NOTE — TELEPHONE ENCOUNTER
Per Chloe at [facility], this was originally initiated by patient. It was cancelled due to lack of response to additional question set.   Central Prior Authorization Team   Phone: [phone]      PA Initiation    Medication: Continuous Blood Gluc Sensor (DEXCOM G6 SENSOR) MISC-PA initiated  Insurance Company: [employer] - Phone [phone] Fax [phone]  Pharmacy Filling the Rx: [facility] - [address]  Filling Pharmacy Phone: [phone]  Filling Pharmacy Fax:    Start Date: 3/22/2022

## 2022-03-24 NOTE — TELEPHONE ENCOUNTER
Prior Authorization Approval-approval covers sensors and transmitter    Authorization Effective Date: 3/22/2022  Authorization Expiration Date: 3/22/2023  Medication: Continuous Blood Gluc Sensor (DEXCOM G6 SENSOR) MISC-PA approved  Approved Dose/Quantity:   Reference #: [id number]   Insurance Company: [employer] - Phone [phone] Fax [phone]  Expected CoPay:       CoPay Card Available:      Foundation Assistance Needed:    Which Pharmacy is filling the prescription (Not needed for infusion/clinic administered): [facility] - [facility], [address]  Pharmacy Notified: No  Patient Notified: Yes

## 2022-03-26 ASSESSMENT — ENCOUNTER SYMPTOMS
CONSTIPATION: 0
ABDOMINAL PAIN: 0
COUGH: 0
JOINT SWELLING: 0
WEAKNESS: 0
EYE PAIN: 0
PALPITATIONS: 0
NERVOUS/ANXIOUS: 0
FEVER: 0
CHILLS: 0
DIZZINESS: 0
MYALGIAS: 0
HEMATOCHEZIA: 0
HEARTBURN: 0
DYSURIA: 0
PARESTHESIAS: 0
SHORTNESS OF BREATH: 0
NAUSEA: 0
SORE THROAT: 0
HEMATURIA: 0
HEADACHES: 0
FREQUENCY: 0
DIARRHEA: 0
ARTHRALGIAS: 0

## 2022-03-30 NOTE — PROGRESS NOTES
SUBJECTIVE:   CC: Taj Bermeo is an 42 year old male who presents for preventative health visit.     This is a new patient to me in the clinic here to establish and for a physical.  He has longstanding diabetes with retinopathy, diabetes diagnosed at age 8.  He has a closed low glucose pump and sees endocrine yearly.  He is up-to-date on his eye exam.  No sores on his feet or toes or tingling or numbness.  He exercises some.  He feels quite well with no complaints.    He is  and has 2 kids ages 6 and 3.  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        Healthy Habits:     Getting at least 3 servings of Calcium per day:  Yes    Bi-annual eye exam:  Yes    Dental care twice a year:  Yes    Sleep apnea or symptoms of sleep apnea:  None    Diet:  Diabetic    Frequency of exercise:  1 day/week    Duration of exercise:  15-30 minutes    Taking medications regularly:  Yes    Medication side effects:  Not applicable    PHQ-2 Total Score: 0    Additional concerns today:  No              Today's PHQ-2 Score:   PHQ-2 ( 1999 Pfizer) 3/26/2022   Q1: Little interest or pleasure in doing things 0   Q2: Feeling down, depressed or hopeless 0   PHQ-2 Score 0   PHQ-2 Total Score (12-17 Years)- Positive if 3 or more points; Administer PHQ-A if positive -   Q1: Little interest or pleasure in doing things Not at all   Q2: Feeling down, depressed or hopeless Not at all   PHQ-2 Score 0                  Past Medical History:      Past Medical History:   Diagnosis Date     Diabetes mellitus type 1 (H)     since age 8, medtronics pump,      high cholesterol 04/2015    Taking Lipitor generic     Mild nonproliferative diabetic retinopathy of both eyes without macular edema associated with type 1 diabetes mellitus (H)              Past Surgical History:      Past Surgical History:   Procedure Laterality Date     wisdom teeth removed  1995             Social History:     Social History      Socioeconomic History     Marital status:      Spouse name: Not on file     Number of children: 3     Years of education: Not on file     Highest education level: Not on file   Occupational History     Occupation: develops medical devices, runs consulting group   Tobacco Use     Smoking status: Never Smoker     Smokeless tobacco: Never Used   Substance and Sexual Activity     Alcohol use: Yes     Comment: drink sparingly (3-4 servings / wk)     Drug use: Never     Sexual activity: Yes     Partners: Female     Birth control/protection: Condom, Pill   Other Topics Concern     Parent/sibling w/ CABG, MI or angioplasty before 65F 55M? No   Social History Narrative     Not on file     Social Determinants of Health     Financial Resource Strain: Not on file   Food Insecurity: Not on file   Transportation Needs: Not on file   Physical Activity: Not on file   Stress: Not on file   Social Connections: Not on file   Intimate Partner Violence: Not on file   Housing Stability: Not on file             Family History:   reviewed         Allergies:   No Known Allergies          Medications:     Current Outpatient Medications   Medication Sig Dispense Refill     atorvastatin (LIPITOR) 10 MG tablet Take 1 tablet (10 mg) by mouth daily 90 tablet 1     blood glucose (NO BRAND SPECIFIED) test strip Use to test blood sugar 4 times daily or as directed. 200 strip 3     blood glucose monitoring (NO BRAND SPECIFIED) test strip Use to test blood sugar 10 times daily or as directed. 300 strip 11     Continuous Blood Gluc  (DEXCOM G6 ) FELIX 1 Device daily Use to read blood sugars as  instructions 1 Device 0     Continuous Blood Gluc Sensor (DEXCOM G6 SENSOR) MISC 1 each every 10 days Change every 10 days 9 each 1     Continuous Blood Gluc Transmit (DEXCOM G6 TRANSMITTER) MISC 1 Device every 3 months Change every 3 months 1 each 1     Glucose Blood (BLOOD GLUCOSE TEST STRIPS) STRP 10 times daily        "insulin aspart (NOVOLOG VIAL) 100 UNITS/ML vial USE APPROXIMATELY 96 UNITS IN PUMP DAILY 90 mL 1     insulin cartridge (T:SLIM 3ML) misc pump supply Insulin cartridge to be used with pump as directed.  Change every 2-3 days or as directed. 40 each 1     Insulin Infusion Pump Supplies (AUTOSOFT XC INFUSION SET) MISC 1 each every other day Change every 2 to 3 days.  Cannula length is 9mm. Tubing length is 43 inches. 40 each 1     insulin lispro (HUMALOG) 100 UNIT/ML vial Use in pump basal rate + meal coverage. Approx. 96 units daily. 90 mL 0     Lancets MISC checks 10 times daily       Multiple Vitamins-Minerals (MULTIVITAMIN ADULT PO)        VITAMIN D, CHOLECALCIFEROL, PO Take by mouth as needed TAKES IN WINTER MONTHS                 Review of Systems:   The 10 point Review of Systems is negative other than noted in the HPI           Physical Exam:   Blood pressure 137/81, pulse 90, temperature 97  F (36.1  C), temperature source Tympanic, resp. rate 16, height 1.867 m (6' 1.5\"), weight 98 kg (216 lb), SpO2 97 %.    Exam:  Constitutional: healthy appearing, alert and in no distress  Heent: Normocephalic. Head without obvious masses or lesions. PERRLDC, EOMI. Mouth exam within normal limits: tongue, mucous membranes, posterior pharynx all normal, no lesions or abnormalities seen.  Tm's and canals within normal limits bilaterally. Neck supple, no nuchal rigidity or masses. No supraclavicular, or cervical adenopathy. Thyroid symmetric, no masses.  Cardiovascular: Regular rate and rhythm, no murmer, rub or gallops.  JVP not elevated, no edema.  Carotids within normal limits bilaterally, no bruits.  Respiratory: Normal respiratory effort.  Lungs clear, normal flow, no wheezing or crackles.  Breasts: Normal bilaterally.  No masses or lesions.  Nipples within normal limites.  No axillary lesions or nodes.  Gastrointestinal: Normal active bowel sounds.   Soft, not tender, no masses, guarding or rebound.  No " hepatosplenomegaly.   Genitourinary: Rectal not done  Musculoskeletal: extremities normal, no gross deformities noted.  Skin: no suspicious lesions or rashes   Neurologic: Mental status within normal limits.  Speech fluent.  No gross motor abnormalities and gait intact.  Psychiatric: mentation appears normal and affect normal.  Feet within normal limits, no sores, normal pulses         Data:   Labs to be done by endo        Assessment:   1. Normal complete physical exam  2. Diabetes with retinop  3. High cholesterol on statin  4. hcm         Plan:   Up to date immunizations per patient  Exercise, diet and weight loss  Follow up endo  Follow up yearly or prn      Jared Ramos M.D.

## 2022-03-31 ENCOUNTER — OFFICE VISIT (OUTPATIENT)
Dept: FAMILY MEDICINE | Facility: CLINIC | Age: 42
End: 2022-03-31
Payer: COMMERCIAL

## 2022-03-31 VITALS
RESPIRATION RATE: 16 BRPM | HEIGHT: 74 IN | BODY MASS INDEX: 27.72 KG/M2 | TEMPERATURE: 97 F | HEART RATE: 90 BPM | SYSTOLIC BLOOD PRESSURE: 137 MMHG | OXYGEN SATURATION: 97 % | DIASTOLIC BLOOD PRESSURE: 81 MMHG | WEIGHT: 216 LBS

## 2022-03-31 DIAGNOSIS — E10.3293 TYPE 1 DIABETES MELLITUS WITH MILD NONPROLIFERATIVE RETINOPATHY OF BOTH EYES, MACULAR EDEMA PRESENCE UNSPECIFIED (H): ICD-10-CM

## 2022-03-31 DIAGNOSIS — E78.00 HIGH CHOLESTEROL: ICD-10-CM

## 2022-03-31 DIAGNOSIS — Z00.00 ENCOUNTER FOR ANNUAL PHYSICAL EXAM: Primary | ICD-10-CM

## 2022-03-31 PROCEDURE — 99386 PREV VISIT NEW AGE 40-64: CPT | Performed by: INTERNAL MEDICINE

## 2022-03-31 ASSESSMENT — PAIN SCALES - GENERAL: PAINLEVEL: NO PAIN (0)

## 2022-04-28 NOTE — PROGRESS NOTES
Outcome for 04/28/22 3:21 PM: [employer] message sent  HILARIO Mccartney  Outcome for 05/03/22 12:13 PM: Left Voicemail   HILARIO Mccartney  Outcome for 05/04/22 9:53 AM: Patient will connect to clinic prior to visit  HILARIO Mccartney   Outcome for 05/04/22 10:01 AM: [employer] message sent  HILARIO Mccartney  Outcome for 05/05/22 8:31 AM: Tandem emailed to provider  HILARIO Mccartney

## 2022-04-29 ENCOUNTER — LAB (OUTPATIENT)
Dept: LAB | Facility: CLINIC | Age: 42
End: 2022-04-29
Payer: COMMERCIAL

## 2022-04-29 DIAGNOSIS — E10.9 TYPE 1 DIABETES MELLITUS WITHOUT COMPLICATION (H): ICD-10-CM

## 2022-04-29 LAB
ALBUMIN SERPL-MCNC: 4.1 G/DL (ref 3.4–5)
ALP SERPL-CCNC: 71 U/L (ref 40–150)
ALT SERPL W P-5'-P-CCNC: 35 U/L (ref 0–70)
ANION GAP SERPL CALCULATED.3IONS-SCNC: 5 MMOL/L (ref 3–14)
AST SERPL W P-5'-P-CCNC: 10 U/L (ref 0–45)
BILIRUB SERPL-MCNC: 0.9 MG/DL (ref 0.2–1.3)
BUN SERPL-MCNC: 10 MG/DL (ref 7–30)
CALCIUM SERPL-MCNC: 10 MG/DL (ref 8.5–10.1)
CHLORIDE BLD-SCNC: 105 MMOL/L (ref 94–109)
CHOLEST SERPL-MCNC: 135 MG/DL
CO2 SERPL-SCNC: 29 MMOL/L (ref 20–32)
CREAT SERPL-MCNC: 0.96 MG/DL (ref 0.66–1.25)
CREAT UR-MCNC: 15 MG/DL
FASTING STATUS PATIENT QL REPORTED: YES
GFR SERPL CREATININE-BSD FRML MDRD: >90 ML/MIN/1.73M2
GLUCOSE BLD-MCNC: 78 MG/DL (ref 70–99)
HBA1C MFR BLD: 6.9 % (ref 0–5.6)
HDLC SERPL-MCNC: 52 MG/DL
LDLC SERPL CALC-MCNC: 73 MG/DL
MICROALBUMIN UR-MCNC: <5 MG/L
MICROALBUMIN/CREAT UR: NORMAL MG/G{CREAT}
NONHDLC SERPL-MCNC: 83 MG/DL
POTASSIUM BLD-SCNC: 4.3 MMOL/L (ref 3.4–5.3)
PROT SERPL-MCNC: 7.7 G/DL (ref 6.8–8.8)
SODIUM SERPL-SCNC: 139 MMOL/L (ref 133–144)
T4 FREE SERPL-MCNC: 0.91 NG/DL (ref 0.76–1.46)
TRIGL SERPL-MCNC: 52 MG/DL
TSH SERPL DL<=0.005 MIU/L-ACNC: 0.75 MU/L (ref 0.4–4)

## 2022-04-29 PROCEDURE — 84443 ASSAY THYROID STIM HORMONE: CPT

## 2022-04-29 PROCEDURE — 36415 COLL VENOUS BLD VENIPUNCTURE: CPT

## 2022-04-29 PROCEDURE — 80053 COMPREHEN METABOLIC PANEL: CPT

## 2022-04-29 PROCEDURE — 82043 UR ALBUMIN QUANTITATIVE: CPT

## 2022-04-29 PROCEDURE — 83036 HEMOGLOBIN GLYCOSYLATED A1C: CPT

## 2022-04-29 PROCEDURE — 84439 ASSAY OF FREE THYROXINE: CPT

## 2022-04-29 PROCEDURE — 80061 LIPID PANEL: CPT

## 2022-05-04 ENCOUNTER — MYC MEDICAL ADVICE (OUTPATIENT)
Dept: ENDOCRINOLOGY | Facility: CLINIC | Age: 42
End: 2022-05-04
Payer: COMMERCIAL

## 2022-05-06 ENCOUNTER — VIRTUAL VISIT (OUTPATIENT)
Dept: ENDOCRINOLOGY | Facility: CLINIC | Age: 42
End: 2022-05-06
Payer: COMMERCIAL

## 2022-05-06 DIAGNOSIS — E10.9 TYPE 1 DIABETES MELLITUS WITHOUT COMPLICATION (H): Primary | ICD-10-CM

## 2022-05-06 DIAGNOSIS — Z46.81 INSULIN PUMP TITRATION: ICD-10-CM

## 2022-05-06 PROCEDURE — 99214 OFFICE O/P EST MOD 30 MIN: CPT | Mod: 95 | Performed by: INTERNAL MEDICINE

## 2022-05-06 RX ORDER — METFORMIN HCL 500 MG
500 TABLET, EXTENDED RELEASE 24 HR ORAL
Qty: 90 TABLET | Refills: 3 | Status: SHIPPED | OUTPATIENT
Start: 2022-05-06 | End: 2023-02-03

## 2022-05-06 NOTE — PROGRESS NOTES
Taj Bermeo is being evaluated via a billable video visit.      How would you like to obtain your AVS? [employer]  For the video visit, send the invitation by: Send to e-mail at: [email or web address]  Will anyone else be joining your video visit? No      Video visit  Start 12:50 pm  End  1:10 pm   Amwell                               - Endocrinology Follow up -    Reason for visit/consult:  DM1 on insulin pump    Primary care provider: Confirmed, No PCP    Assessment and Plan  A 42 year old male with DM1 on insulin pump 630G, no DM complication, this time A1C 6.9,     # DM1  A1c 6.9  Slight increase A1c this time. Average glucose 170, 31% targeted and 58 % quique    No change pump setting this time (already 64 units daily current)      Discuss the additional option of Metofmin or Jardiance trial. He prefers metformin 3 months trial.     Try Metformin  mg daily for the next 3 months       Basal (this will help control IQ for the limit)  0:00    1.85  3:00    1.7  6:00     1.85  8:00    2.00  11:30   2.0   16:30  2.00  22:30  1.80    Bolus 0:00 6.0     If breakfst and dinner low, then do (I gave option)  0:00 7.0  11:00 6.0  14:00 7.0     # Afternoon hyperglycemia  After lunch persistently 250-300s until before dinner.   Change made above.     # DM device  Patient want sot switch to Tandem/Dexcom    #Diabetes complications  No complications noticed,   Ophthalmology updated.    Ordered annual Screen for urine microalbumin CMP TSH free T4 and a fasting lipid.    RTC with me in 4 months this time due to checking metformin trial.         30  minutes spent on the date of the encounter doing chart review, history and exam, documentation and further activities as noted above.    Lilia Mendoza MD  Staff Physician  Endocrinology and Metabolism  [facility]  License: [id number]  Pager: [phone]    Interval History as of 5/6/2022 : Patient has been doing well.  Medication compliance : complaint  well to insulin pump including bolus . New event includes: busy with stress at work, and tend to elevated glucose recently and also lack of exercise.  Interval History as of 4/12/2021 : Patient has been doing well. Annual follow up. earler afternoon hypoglycemia occasionally, with using control IQ.  Interval History as of 2/3/2020 : Patient has been doing well. Last seen 14 month ago.  Interval History: This is third visit. Last visit was 1 year ago. Had a baby in his family 7/2018, busy and lack of sleep. Noticed mornign spikes (240s) without breakfast, and also afternoon spike.   HPI: A 39 yo male here for the initial visit for his DM1. He moved from [place] to [place] 10/2016, now starts to establish care in [place]. He was seen at [facility], Dr. Mario, last seen in 9/2016.    He has DM1 since age 8, at that time he had polyuria, glucose was 750 during the baseball game. Insulin pump ([employer]) was started 15 years ago.    Update: he met mohit and andrey and he was started on new insulin pump 630G, which he feels satisfied. Compared to previous time, he started to feel that he bolus more properly than just keep pushing manual bolus. Again, concern and insertion site and he thinks based on the site, insulin absorption can be delayed and sometimes takes 2 hours to start to work after bolus.     He also think basal some point seems too much, but he is not sure what point. There is several episodes of glucose 70s 60s later afternoon, but looks after multiple bolus, and he does not want to change today, rather he wants to download by himself and wants to accumulate more data and want to report us.        Ref. Range 5/10/2017 00:00 11/3/2017 16:15 11/28/2018 00:00   Hemoglobin A1C Latest Ref Range: 4.3 - 6.0 %  7.5 (H)    Hemoglobin A1C Latest Ref Range: 4.3 - 6.0 % 7.9 (A)  7.9 (A)       Current regimnes:   Tandem    Basal   0:00    1.85  3:00    1.7  6:00     1.85  8:00    2.00  11:00    2.00  16:30  2.00  22:30  1.80    Bolus  0:00 6.0     Life style:  Wake up 6am  Breakfast: skip, coffee only  Lunch: sandwich (70-90g)  No much snacks  Dinner: (70-90g carb)      DM complications:  Retinopathy: none  Nephropathy: NEGATIVE urine microalbumin 4/2022  Neuropathy: no  Most recent LDL: 73  4/2022  TSH 0.75 (4/2022)    Continues glucose monitoring: We downloaded CGM and insulin pump and summarized here.  CGM: Average glucose 170  31% targeted range, 55% high mild    Total daily insulin : 64 units/day    Past Medical/Surgical History:  Past Medical History:   Diagnosis Date     Diabetes mellitus type 1 (H)     since age 8, medtronics pump,      high cholesterol 04/2015    Taking Lipitor generic     Mild nonproliferative diabetic retinopathy of both eyes without macular edema associated with type 1 diabetes mellitus (H)      Past Surgical History:   Procedure Laterality Date     wisdom teeth removed  1995       Allergies:  No Known Allergies    Current Medications   Current Outpatient Medications   Medication     atorvastatin (LIPITOR) 10 MG tablet     blood glucose (NO BRAND SPECIFIED) test strip     blood glucose monitoring (NO BRAND SPECIFIED) test strip     Continuous Blood Gluc  (DEXCOM G6 ) FELIX     Continuous Blood Gluc Sensor (DEXCOM G6 SENSOR) MISC     Continuous Blood Gluc Transmit (DEXCOM G6 TRANSMITTER) MISC     Glucose Blood (BLOOD GLUCOSE TEST STRIPS) STRP     insulin aspart (NOVOLOG VIAL) 100 UNITS/ML vial     insulin cartridge (T:SLIM 3ML) misc pump supply     Insulin Infusion Pump Supplies (AUTOSOFT XC INFUSION SET) MISC     insulin lispro (HUMALOG) 100 UNIT/ML vial     Lancets MISC     metFORMIN (GLUCOPHAGE XR) 500 MG 24 hr tablet     Multiple Vitamins-Minerals (MULTIVITAMIN ADULT PO)     VITAMIN D, CHOLECALCIFEROL, PO     No current facility-administered medications for this visit.       Family History:  Family History   Problem Relation Age of Onset     No Known Problems  Mother      Nephrolithiasis Father      No Known Problems Sister      Hypertension Paternal Grandmother      Diabetes Paternal Grandfather         Type 2, adult onset     Hypertension Paternal Grandfather      Hyperlipidemia Paternal Grandfather         [place]     Second cousin DM1, grandparents DM2, no thyroid issues, no celiac, no RA in family    Social History:  Social History     Tobacco Use     Smoking status: Never Smoker     Smokeless tobacco: Never Used   Substance Use Topics     Alcohol use: Yes     Comment: drink sparingly (3-4 servings / wk)   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      ROS:  Full review of systems taken with the help of the intake sheet. Otherwise a complete 14 point review of systems was taken and is negative unless stated in the history above.      Physical Exam:   There were no vitals taken for this visit.  General: well appearing, no acute distress, pleasant and conversant,   Mental Status/neuro: alert and oriented  Face: symmetrical, normal facial color  Eyes: anicteric,, no proptosis or lid lag  Resp: normally breathing

## 2022-05-06 NOTE — LETTER
5/6/2022         RE: Taj Beremo  [address]  [place] [address]        Dear Colleague,    Thank you for referring your patient, Taj Bermeo, to the [facility]. Please see a copy of my visit note below.    Outcome for 04/28/22 3:21 PM: [employer] message sent  Veronica Nichols VF  Outcome for 05/03/22 12:13 PM: Left Voicemail   Veronica Nichols VF  Outcome for 05/04/22 9:53 AM: Patient will connect to clinic prior to visit  HILARIO Mccartney   Outcome for 05/04/22 10:01 AM: [employer] message sent  Veronica Nichols HILARIO  Outcome for 05/05/22 8:31 AM: Tandem emailed to provider  HILARIO Mccartney                Taj Bermeo is being evaluated via a billable video visit.      How would you like to obtain your AVS? [employer]  For the video visit, send the invitation by: Send to e-mail at: [email or web address]  Will anyone else be joining your video visit? No      Video visit  Start 12:50 pm  End  1:10 pm   Amwell                               - Endocrinology Follow up -    Reason for visit/consult:  DM1 on insulin pump    Primary care provider: Confirmed, No PCP    Assessment and Plan  A 42 year old male with DM1 on insulin pump 630G, no DM complication, this time A1C 6.9,     # DM1  A1c 6.9  Slight increase A1c this time. Average glucose 170, 31% targeted and 58 % quique    No change pump setting this time (already 64 units daily current)      Discuss the additional option of Metofmin or Jardiance trial. He prefers metformin 3 months trial.     Try Metformin  mg daily for the next 3 months       Basal (this will help control IQ for the limit)  0:00    1.85  3:00    1.7  6:00     1.85  8:00    2.00  11:30   2.0   16:30  2.00  22:30  1.80    Bolus 0:00 6.0     If breakfst and dinner low, then do (I gave option)  0:00 7.0  11:00 6.0  14:00 7.0     # Afternoon hyperglycemia  After lunch persistently 250-300s until before dinner.   Change made above.     # DM device  Patient want sot  switch to Tandem/Dexcom    #Diabetes complications  No complications noticed,   Ophthalmology updated.    Ordered annual Screen for urine microalbumin CMP TSH free T4 and a fasting lipid.    RTC with me in 4 months this time due to checking metformin trial.         30  minutes spent on the date of the encounter doing chart review, history and exam, documentation and further activities as noted above.    Lilia Mendoza MD  Staff Physician  Endocrinology and Metabolism  [facility]  License: [place] [id number]  Pager: [phone]    Interval History as of 5/6/2022 : Patient has been doing well.  Medication compliance : complaint well to insulin pump including bolus . New event includes: busy with stress at work, and tend to elevated glucose recently and also lack of exercise.  Interval History as of 4/12/2021 : Patient has been doing well. Annual follow up. earler afternoon hypoglycemia occasionally, with using control IQ.  Interval History as of 2/3/2020 : Patient has been doing well. Last seen 14 month ago.  Interval History: This is third visit. Last visit was 1 year ago. Had a baby in his family 7/2018, busy and lack of sleep. Noticed mornign spikes (240s) without breakfast, and also afternoon spike.   HPI: A 39 yo male here for the initial visit for his DM1. He moved from [place] to [place] 10/2016, now starts to establish care in [place]. He was seen at [facility], Dr. Mario, last seen in 9/2016.    He has DM1 since age 8, at that time he had polyuria, glucose was 750 during the baseball game. Insulin pump ([employer]tron[employer]) was started 15 years ago.    Update: he met mohit and andrey and he was started on new insulin pump 630G, which he feels satisfied. Compared to previous time, he started to feel that he bolus more properly than just keep pushing manual bolus. Again, concern and insertion site and he thinks based on the site, insulin absorption can be delayed and sometimes takes 2 hours to  start to work after bolus.     He also think basal some point seems too much, but he is not sure what point. There is several episodes of glucose 70s 60s later afternoon, but looks after multiple bolus, and he does not want to change today, rather he wants to download by himself and wants to accumulate more data and want to report us.        Ref. Range 5/10/2017 00:00 11/3/2017 16:15 11/28/2018 00:00   Hemoglobin A1C Latest Ref Range: 4.3 - 6.0 %  7.5 (H)    Hemoglobin A1C Latest Ref Range: 4.3 - 6.0 % 7.9 (A)  7.9 (A)       Current regimnes:   Tandem    Basal   0:00    1.85  3:00    1.7  6:00     1.85  8:00    2.00  11:00   2.00  16:30  2.00  22:30  1.80    Bolus  0:00 6.0     Life style:  Wake up 6am  Breakfast: skip, coffee only  Lunch: sandwich (70-90g)  No much snacks  Dinner: (70-90g carb)      DM complications:  Retinopathy: none  Nephropathy: NEGATIVE urine microalbumin 4/2022  Neuropathy: no  Most recent LDL: 73  4/2022  TSH 0.75 (4/2022)    Continues glucose monitoring: We downloaded CGM and insulin pump and summarized here.  CGM: Average glucose 170  31% targeted range, 55% high mild    Total daily insulin : 64 units/day    Past Medical/Surgical History:  Past Medical History:   Diagnosis Date     Diabetes mellitus type 1 (H)     since age 8, medtronics pump,      high cholesterol 04/2015    Taking Lipitor generic     Mild nonproliferative diabetic retinopathy of both eyes without macular edema associated with type 1 diabetes mellitus (H)      Past Surgical History:   Procedure Laterality Date     wisdom teeth removed  1995       Allergies:  No Known Allergies    Current Medications   Current Outpatient Medications   Medication     atorvastatin (LIPITOR) 10 MG tablet     blood glucose (NO BRAND SPECIFIED) test strip     blood glucose monitoring (NO BRAND SPECIFIED) test strip     Continuous Blood Gluc  (DEXCOM G6 ) FELIX     Continuous Blood Gluc Sensor (DEXCOM G6 SENSOR) MISC      Continuous Blood Gluc Transmit (DEXCOM G6 TRANSMITTER) MISC     Glucose Blood (BLOOD GLUCOSE TEST STRIPS) STRP     insulin aspart (NOVOLOG VIAL) 100 UNITS/ML vial     insulin cartridge (T:SLIM 3ML) misc pump supply     Insulin Infusion Pump Supplies (AUTOSOFT XC INFUSION SET) MISC     insulin lispro (HUMALOG) 100 UNIT/ML vial     Lancets MISC     metFORMIN (GLUCOPHAGE XR) 500 MG 24 hr tablet     Multiple Vitamins-Minerals (MULTIVITAMIN ADULT PO)     VITAMIN D, CHOLECALCIFEROL, PO     No current facility-administered medications for this visit.       Family History:  Family History   Problem Relation Age of Onset     No Known Problems Mother      Nephrolithiasis Father      No Known Problems Sister      Hypertension Paternal Grandmother      Diabetes Paternal Grandfather         Type 2, adult onset     Hypertension Paternal Grandfather      Hyperlipidemia Paternal Grandfather         [place]     Second cousin DM1, grandparents DM2, no thyroid issues, no celiac, no RA in family    Social History:  Social History     Tobacco Use     Smoking status: Never Smoker     Smokeless tobacco: Never Used   Substance Use Topics     Alcohol use: Yes     Comment: drink sparingly (3-4 servings / wk)   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      ROS:  Full review of systems taken with the help of the intake sheet. Otherwise a complete 14 point review of systems was taken and is negative unless stated in the history above.      Physical Exam:   There were no vitals taken for this visit.  General: well appearing, no acute distress, pleasant and conversant,   Mental Status/neuro: alert and oriented  Face: symmetrical, normal facial color  Eyes: anicteric,, no proptosis or lid lag  Resp: normally breathing        Again, thank you for allowing me to participate in the care of your patient.        Sincerely,        Lilia Mendoza MD

## 2022-05-09 ENCOUNTER — TELEPHONE (OUTPATIENT)
Dept: ENDOCRINOLOGY | Facility: CLINIC | Age: 42
End: 2022-05-09
Payer: COMMERCIAL

## 2022-05-09 NOTE — TELEPHONE ENCOUNTER
Return in about 4 months (around 9/6/2022).- Per Dr.Araki HERNANDEZ with Endo scheduling number.  Taylor Myhre,VF

## 2022-05-11 ENCOUNTER — TELEPHONE (OUTPATIENT)
Dept: ENDOCRINOLOGY | Facility: CLINIC | Age: 42
End: 2022-05-11
Payer: COMMERCIAL

## 2022-05-11 NOTE — TELEPHONE ENCOUNTER
5/11 1st attempt.  LVM for patient to schedule a 4 month follow up visit with Dr. Mendoza around 9/6/22.    Please assist patient in scheduling if he calls back.    Thanks    Maria Esther Dickens  Pediatric Specialty /Adult Endocrinology  [facility]

## 2022-05-19 NOTE — TELEPHONE ENCOUNTER
5/19 2nd attempt.  LVM for patient to schedule a 4 month follow up visit with Dr. Mendoza around 9/6/22.    Please assist patient in scheduling when he calls back.    Thanks    Maria Esther Dickens  Pediatric Specialty /Adult Endocrinology  [facility]

## 2022-08-11 DIAGNOSIS — E10.9 DIABETES MELLITUS TYPE 1 (H): ICD-10-CM

## 2022-08-13 NOTE — TELEPHONE ENCOUNTER
HUMALOG VIAL 10ML 100U/ML  Last Written Prescription Date:  3/18/2022  Last Fill Quantity: 90,   # refills: 0  Last Office Visit :  5/6/2022  Future Office visit:  None    Routing refill request to provider for review/approval because:  Drug not on the [facility], [facility] or [facility] refill protocol or controlled substance      Margaret Bhagat RN  Central Triage Red Flags/Med Refills

## 2022-08-15 RX ORDER — INSULIN LISPRO 100 [IU]/ML
INJECTION, SOLUTION INTRAVENOUS; SUBCUTANEOUS
Qty: 90 ML | Refills: 3 | Status: SHIPPED | OUTPATIENT
Start: 2022-08-15 | End: 2023-07-25

## 2022-09-11 ENCOUNTER — HEALTH MAINTENANCE LETTER (OUTPATIENT)
Age: 42
End: 2022-09-11

## 2022-09-23 ENCOUNTER — MYC REFILL (OUTPATIENT)
Dept: ENDOCRINOLOGY | Facility: CLINIC | Age: 42
End: 2022-09-23

## 2022-09-23 DIAGNOSIS — E10.9 TYPE 1 DIABETES MELLITUS WITHOUT COMPLICATION (H): ICD-10-CM

## 2022-09-23 RX ORDER — PROCHLORPERAZINE 25 MG/1
1 SUPPOSITORY RECTAL
Qty: 9 EACH | Refills: 1 | Status: SHIPPED | OUTPATIENT
Start: 2022-09-23 | End: 2023-03-08

## 2022-09-23 RX ORDER — PROCHLORPERAZINE 25 MG/1
1 SUPPOSITORY RECTAL DAILY
Qty: 1 EACH | Refills: 0 | Status: SHIPPED | OUTPATIENT
Start: 2022-09-23

## 2022-09-23 RX ORDER — PROCHLORPERAZINE 25 MG/1
1 SUPPOSITORY RECTAL
Qty: 1 EACH | Refills: 1 | Status: SHIPPED | OUTPATIENT
Start: 2022-09-23 | End: 2023-03-08

## 2023-01-23 ENCOUNTER — HEALTH MAINTENANCE LETTER (OUTPATIENT)
Age: 43
End: 2023-01-23

## 2023-01-27 NOTE — PROGRESS NOTES
Taj Bermeo  is being evaluated via a billable video visit.      How would you like to obtain your AVS? [email or web address]  For the video visit, send the invitation by: Text to cell phone: [phone]  Will anyone else be joining your video visit? No      Outcome for 01/27/23 4:07 PM: Data uploaded on Tandem  Ana Bustos MA  Outcome for 02/01/23 8:13 AM: Tandem emailed to provider  Taylor Myhre, RMA      Video visit  Start 8:44 am  End  9:03 am   [facility]                               - Endocrinology Follow up -    Reason for visit/consult:  DM1 on insulin pump    Primary care provider: Confirmed, No PCP    Assessment and Plan  A 43 year old male with DM1 on insulin pump 630G, no DM complication, this time A1C 6.9,     # DM1  A1c every 3 month  Average glucose 159, 42% targeted and 47 % quique    Bolus change 11 am: increase 1:6 to 1:5  (ave use 73 units bong)      Increase Metformin  mg daily to BID      Basal (this will help control IQ for the limit)  0:00    1.85  3:00    1.7  6:00     1.85  8:00    2.00  11:30   2.0   16:30  2.00  22:30  1.80    # Afternoon hyperglycemia  After lunch persistently 250-300s until before dinner.   Change made above.     # DM device  Patient want sot switch to Tandem/Dexcom    #Diabetes complications  No complications noticed,   Ophthalmology updated.    Ordered annual Screen for urine microalbumin CMP TSH free T4 and a fasting lipid.    RTC with me in 4 months this time due to checking metformin trial.         30  minutes spent on the date of the encounter doing chart review, history and exam, documentation and further activities as noted above.    Lilia Mendoza MD  Staff Physician  Endocrinology and Metabolism  [facility]  License: [place] [id number]  Pager: [phone]    Interval History as of 2/3/2023 : Patient has been doing well. Medication compliance: excellent, counting carb. Still has pattern of hyperglycemia post lunch.Average 159.  Interval History as of  5/6/2022 : Patient has been doing well.  Medication compliance : complaint well to insulin pump including bolus . New event includes: busy with stress at work, and tend to elevated glucose recently and also lack of exercise.  Interval History as of 4/12/2021 : Patient has been doing well. Annual follow up. earler afternoon hypoglycemia occasionally, with using control IQ.  Interval History as of 2/3/2020 : Patient has been doing well. Last seen 14 month ago.  Interval History: This is third visit. Last visit was 1 year ago. Had a baby in his family 7/2018, busy and lack of sleep. Noticed mornign spikes (240s) without breakfast, and also afternoon spike.   HPI: A 39 yo male here for the initial visit for his DM1. He moved from [place] to [place] 10/2016, now starts to establish care in [place]. He was seen at [facility], Dr. Mario, last seen in 9/2016.    He has DM1 since age 8, at that time he had polyuria, glucose was 750 during the baseball game. Insulin pump ([employer]) was started 15 years ago.    Update: he met mohit and andrey and he was started on new insulin pump 630G, which he feels satisfied. Compared to previous time, he started to feel that he bolus more properly than just keep pushing manual bolus. Again, concern and insertion site and he thinks based on the site, insulin absorption can be delayed and sometimes takes 2 hours to start to work after bolus.     He also think basal some point seems too much, but he is not sure what point. There is several episodes of glucose 70s 60s later afternoon, but looks after multiple bolus, and he does not want to change today, rather he wants to download by himself and wants to accumulate more data and want to report us.        Ref. Range 5/10/2017 00:00 11/3/2017 16:15 11/28/2018 00:00   Hemoglobin A1C Latest Ref Range: 4.3 - 6.0 %  7.5 (H)    Hemoglobin A1C Latest Ref Range: 4.3 - 6.0 % 7.9 (A)  7.9 (A)       Current regimnes:   Tandem    Basal    0:00    1.85  3:00    1.7  6:00     1.85  8:00    2.00  11:00   2.00  16:30  2.00  22:30  1.80    Bolus  0:00 6.0   11:00 5.0  16:30 6.0    Life style:  Wake up 6am  Breakfast: skip, coffee only  Lunch: sandwich (70-90g)  No much snacks  Dinner: (70-90g carb)      DM complications:  Retinopathy: none  Nephropathy: NEGATIVE urine microalbumin 4/2022  Neuropathy: no  Most recent LDL: 73  4/2022  TSH 0.75 (4/2022)    Continues glucose monitoring: We downloaded CGM and insulin pump and summarized here.  CGM: Average glucose 159  42% targeted range, 46% high mild    Total daily insulin : 73 units/day    Past Medical/Surgical History:  Past Medical History:   Diagnosis Date     Diabetes mellitus type 1 (H)     since age 8, [employer]tron[employer] pump,      high cholesterol 04/2015    Taking Lipitor generic     Mild nonproliferative diabetic retinopathy of both eyes without macular edema associated with type 1 diabetes mellitus (H)      Past Surgical History:   Procedure Laterality Date     wisdom teeth removed  1995       Allergies:  No Known Allergies    Current Medications   Current Outpatient Medications   Medication     atorvastatin (LIPITOR) 10 MG tablet     blood glucose (NO BRAND SPECIFIED) test strip     blood glucose monitoring (NO BRAND SPECIFIED) test strip     Continuous Blood Gluc  (DEXCOM G6 ) FELIX     Continuous Blood Gluc Sensor (DEXCOM G6 SENSOR) MISC     Continuous Blood Gluc Transmit (DEXCOM G6 TRANSMITTER) MISC     Glucose Blood (BLOOD GLUCOSE TEST STRIPS) STRP     insulin aspart (NOVOLOG VIAL) 100 UNITS/ML vial     insulin cartridge (T:SLIM 3ML) misc pump supply     Insulin Infusion Pump Supplies (AUTOSOFT XC INFUSION SET) MISC     insulin lispro (HUMALOG) 100 UNIT/ML vial     Lancets MISC     metFORMIN (GLUCOPHAGE XR) 500 MG 24 hr tablet     Multiple Vitamins-Minerals (MULTIVITAMIN ADULT PO)     VITAMIN D, CHOLECALCIFEROL, PO     No current facility-administered medications for this visit.        Family History:  Family History   Problem Relation Age of Onset     No Known Problems Mother      Nephrolithiasis Father      No Known Problems Sister      Hypertension Paternal Grandmother      Diabetes Paternal Grandfather         Type 2, adult onset     Hypertension Paternal Grandfather      Hyperlipidemia Paternal Grandfather         [place]     Second cousin DM1, grandparents DM2, no thyroid issues, no celiac, no RA in family    Social History:  Social History     Tobacco Use     Smoking status: Never     Smokeless tobacco: Never   Substance Use Topics     Alcohol use: Yes     Comment: drink sparingly (3-4 servings / wk)   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      ROS:  Full review of systems taken with the help of the intake sheet. Otherwise a complete 14 point review of systems was taken and is negative unless stated in the history above.      Physical Exam:   There were no vitals taken for this visit.  General: well appearing, no acute distress, pleasant and conversant,   Mental Status/neuro: alert and oriented  Face: symmetrical, normal facial color  Eyes: anicteric,, no proptosis or lid lag  Resp: normally breathing

## 2023-02-03 ENCOUNTER — TELEPHONE (OUTPATIENT)
Dept: ENDOCRINOLOGY | Facility: CLINIC | Age: 43
End: 2023-02-03

## 2023-02-03 ENCOUNTER — VIRTUAL VISIT (OUTPATIENT)
Dept: ENDOCRINOLOGY | Facility: CLINIC | Age: 43
End: 2023-02-03
Payer: COMMERCIAL

## 2023-02-03 DIAGNOSIS — Z46.81 INSULIN PUMP TITRATION: ICD-10-CM

## 2023-02-03 DIAGNOSIS — E10.9 TYPE 1 DIABETES MELLITUS WITHOUT COMPLICATION (H): Primary | ICD-10-CM

## 2023-02-03 PROCEDURE — 99214 OFFICE O/P EST MOD 30 MIN: CPT | Mod: 95 | Performed by: INTERNAL MEDICINE

## 2023-02-03 RX ORDER — METFORMIN HCL 500 MG
500 TABLET, EXTENDED RELEASE 24 HR ORAL 2 TIMES DAILY WITH MEALS
Qty: 180 TABLET | Refills: 3 | Status: SHIPPED | OUTPATIENT
Start: 2023-02-03 | End: 2024-06-07

## 2023-02-03 NOTE — NURSING NOTE
Patient declined individual allergy and medication review by support staff. Patient was driving at the time of check in and could not verify every one of them.

## 2023-02-03 NOTE — LETTER
2/3/2023         RE: Taj Bermeo  [address]  [place] [facility] [address]        Dear Colleague,    Thank you for referring your patient, Taj Bermeo, to the [facility]. Please see a copy of my visit note below.    Taj Bermeo  is being evaluated via a billable video visit.      How would you like to obtain your AVS? MyChart  For the video visit, send the invitation by: Text to cell phone: [phone]  Will anyone else be joining your video visit? No      Outcome for 01/27/23 4:07 PM: Data uploaded on Tandem  Ana Bustos MA  Outcome for 02/01/23 8:13 AM: Tandem emailed to provider  Taylor Myhre, RMA      Video visit  Start 8:44 am  End  9:03 am   [facility]                               - Endocrinology Follow up -    Reason for visit/consult:  DM1 on insulin pump    Primary care provider: Confirmed, No PCP    Assessment and Plan  A 43 year old male with DM1 on insulin pump 630G, no DM complication, this time A1C 6.9,     # DM1  A1c every 3 month  Average glucose 159, 42% targeted and 47 % quique    Bolus change 11 am: increase 1:6 to 1:5  (ave use 73 units bong)      Increase Metformin  mg daily to BID      Basal (this will help control IQ for the limit)  0:00    1.85  3:00    1.7  6:00     1.85  8:00    2.00  11:30   2.0   16:30  2.00  22:30  1.80    # Afternoon hyperglycemia  After lunch persistently 250-300s until before dinner.   Change made above.     # DM device  Patient want sot switch to Tandem/Dexcom    #Diabetes complications  No complications noticed,   Ophthalmology updated.    Ordered annual Screen for urine microalbumin CMP TSH free T4 and a fasting lipid.    RTC with me in 4 months this time due to checking metformin trial.         30  minutes spent on the date of the encounter doing chart review, history and exam, documentation and further activities as noted above.    Lilia Mendoza MD  Staff Physician  Endocrinology and Metabolism  [facility]  License: [place] [id number]  Pager: [phone]    Interval History as of 2/3/2023 : Patient has been doing well. Medication compliance: excellent, counting carb. Still has pattern of hyperglycemia post lunch.Average 159.  Interval History as of 5/6/2022 : Patient has been doing well.  Medication compliance : complaint well to insulin pump including bolus . New event includes: busy with stress at work, and tend to elevated glucose recently and also lack of exercise.  Interval History as of 4/12/2021 : Patient has been doing well. Annual follow up. earler afternoon hypoglycemia occasionally, with using control IQ.  Interval History as of 2/3/2020 : Patient has been doing well. Last seen 14 month ago.  Interval History: This is third visit. Last visit was 1 year ago. Had a baby in his family 7/2018, busy and lack of sleep. Noticed mornign spikes (240s) without breakfast, and also afternoon spike.   HPI: A 37 yo male here for the initial visit for his DM1. He moved from [place] to [place] 10/2016, now starts to establish care in [place]. He was seen at [facility], Dr. Mario, last seen in 9/2016.    He has DM1 since age 8, at that time he had polyuria, glucose was 750 during the baseball game. Insulin pump ([employer]) was started 15 years ago.    Update: he met mohit and andrey and he was started on new insulin pump 630G, which he feels satisfied. Compared to previous time, he started to feel that he bolus more properly than just keep pushing manual bolus. Again, concern and insertion site and he thinks based on the site, insulin absorption can be delayed and sometimes takes 2 hours to start to work after bolus.     He also think basal some point seems too much, but he is not sure what point. There is several episodes of glucose 70s 60s later afternoon, but looks after multiple bolus, and he does not want to change today, rather he wants to download by himself and wants to accumulate more data and want  to report us.        Ref. Range 5/10/2017 00:00 11/3/2017 16:15 11/28/2018 00:00   Hemoglobin A1C Latest Ref Range: 4.3 - 6.0 %  7.5 (H)    Hemoglobin A1C Latest Ref Range: 4.3 - 6.0 % 7.9 (A)  7.9 (A)       Current regimnes:   Tandem    Basal   0:00    1.85  3:00    1.7  6:00     1.85  8:00    2.00  11:00   2.00  16:30  2.00  22:30  1.80    Bolus  0:00 6.0   11:00 5.0  16:30 6.0    Life style:  Wake up 6am  Breakfast: skip, coffee only  Lunch: sandwich (70-90g)  No much snacks  Dinner: (70-90g carb)      DM complications:  Retinopathy: none  Nephropathy: NEGATIVE urine microalbumin 4/2022  Neuropathy: no  Most recent LDL: 73  4/2022  TSH 0.75 (4/2022)    Continues glucose monitoring: We downloaded CGM and insulin pump and summarized here.  CGM: Average glucose 159  42% targeted range, 46% high mild    Total daily insulin : 73 units/day    Past Medical/Surgical History:  Past Medical History:   Diagnosis Date     Diabetes mellitus type 1 (H)     since age 8, [employer]tron[employer] pump,      high cholesterol 04/2015    Taking Lipitor generic     Mild nonproliferative diabetic retinopathy of both eyes without macular edema associated with type 1 diabetes mellitus (H)      Past Surgical History:   Procedure Laterality Date     wisdom teeth removed  1995       Allergies:  No Known Allergies    Current Medications   Current Outpatient Medications   Medication     atorvastatin (LIPITOR) 10 MG tablet     blood glucose (NO BRAND SPECIFIED) test strip     blood glucose monitoring (NO BRAND SPECIFIED) test strip     Continuous Blood Gluc  (DEXCOM G6 ) FELIX     Continuous Blood Gluc Sensor (DEXCOM G6 SENSOR) MISC     Continuous Blood Gluc Transmit (DEXCOM G6 TRANSMITTER) MISC     Glucose Blood (BLOOD GLUCOSE TEST STRIPS) STRP     insulin aspart (NOVOLOG VIAL) 100 UNITS/ML vial     insulin cartridge (T:SLIM 3ML) misc pump supply     Insulin Infusion Pump Supplies (AUTOSOFT XC INFUSION SET) MISC     insulin lispro (HUMALOG)  100 UNIT/ML vial     Lancets MISC     metFORMIN (GLUCOPHAGE XR) 500 MG 24 hr tablet     Multiple Vitamins-Minerals (MULTIVITAMIN ADULT PO)     VITAMIN D, CHOLECALCIFEROL, PO     No current facility-administered medications for this visit.       Family History:  Family History   Problem Relation Age of Onset     No Known Problems Mother      Nephrolithiasis Father      No Known Problems Sister      Hypertension Paternal Grandmother      Diabetes Paternal Grandfather         Type 2, adult onset     Hypertension Paternal Grandfather      Hyperlipidemia Paternal Grandfather         [place]     Second cousin DM1, grandparents DM2, no thyroid issues, no celiac, no RA in family    Social History:  Social History     Tobacco Use     Smoking status: Never     Smokeless tobacco: Never   Substance Use Topics     Alcohol use: Yes     Comment: drink sparingly (3-4 servings / wk)   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      ROS:  Full review of systems taken with the help of the intake sheet. Otherwise a complete 14 point review of systems was taken and is negative unless stated in the history above.      Physical Exam:   There were no vitals taken for this visit.  General: well appearing, no acute distress, pleasant and conversant,   Mental Status/neuro: alert and oriented  Face: symmetrical, normal facial color  Eyes: anicteric,, no proptosis or lid lag  Resp: normally breathing          Again, thank you for allowing me to participate in the care of your patient.        Sincerely,        Lilia Mendoza MD

## 2023-02-09 NOTE — TELEPHONE ENCOUNTER
Left Voicemail (2nd Attempt) for the patient to call back and schedule the following:    Appointment type: Return Diabetes  Provider: Dr. Mendoza  Return date: 2/4/2024  Specialty phone number: [phone]  Additional appointment(s) needed:   Additonal Notes:       Return in about 1 year (around 2/3/2024).      Also sent a [employer] message.    Leslie R/Procedure    [facility]   Neurology, NeuroSurgery, NeuroPsychology and Pain Management Specialties  Medical/Surgical Adult Specialties

## 2023-03-06 DIAGNOSIS — E10.9 TYPE 1 DIABETES MELLITUS WITHOUT COMPLICATION (H): ICD-10-CM

## 2023-03-08 RX ORDER — PROCHLORPERAZINE 25 MG/1
SUPPOSITORY RECTAL
Qty: 3 EACH | Refills: 3 | Status: SHIPPED | OUTPATIENT
Start: 2023-03-08 | End: 2024-06-07

## 2023-03-08 RX ORDER — PROCHLORPERAZINE 25 MG/1
SUPPOSITORY RECTAL
Qty: 9 EACH | Refills: 3 | Status: SHIPPED | OUTPATIENT
Start: 2023-03-08 | End: 2024-02-23

## 2023-05-06 ENCOUNTER — HEALTH MAINTENANCE LETTER (OUTPATIENT)
Age: 43
End: 2023-05-06

## 2023-07-24 DIAGNOSIS — E10.9 DIABETES MELLITUS TYPE 1 (H): ICD-10-CM

## 2023-07-25 RX ORDER — INSULIN LISPRO 100 [IU]/ML
INJECTION, SOLUTION INTRAVENOUS; SUBCUTANEOUS
Qty: 90 ML | Refills: 3 | Status: SHIPPED | OUTPATIENT
Start: 2023-07-25 | End: 2024-02-24

## 2023-07-25 NOTE — TELEPHONE ENCOUNTER
insulin lispro (HUMALOG) 100 UNIT/ML vial 90 mL 3 8/15/2022         Last Written Prescription Date:  8-  Last Fill Quantity: 90ml,   # refills: 3  Last Office Visit : 2-3-2023  Future Office visit:  none    Routing refill request to provider for review/approval because:  Insulin - refilled per clinic      Kathleen M Doege RN

## 2023-07-25 NOTE — TELEPHONE ENCOUNTER
Requested Renewals     Name from pharmacy: HUMALOG VIAL 10ML 100U/ML         Will file in chart as: HUMALOG 100 UNIT/ML injection    Sig: USE IN PUMP BASAL RATE PLUS MEAL COVERAGE, APPROXIMATELY 96 UNITS DAILY    Disp: 90 mL    Refills: 3    Start: 7/24/2023    Class: E-Prescribe    Non-formulary For: Diabetes mellitus type 1 (H)    Last ordered: 11 months ago by Lilia Mendoza MD Last refill: 5/1/2023    Rx #: [id number]    Short Acting Insulin Protocol Kjarth8907/25/2023 11:09 AM   Protocol Details Serum creatinine on file in past 12 months    HgbA1C in past 3 or 6 months    Medication is active on med list    Patient is age 18 or older    Recent (6 mo) or future (30 days) visit within the authorizing provider's specialty      To be filled at: [facility] [place], [place] - [address] [place] [address]        Per 2/3/23 progress note from Dr. Mendoza:  # DM device  Patient want sot switch to Tandem/Dexcom     #Diabetes complications  No complications noticed,   Ophthalmology updated.     Ordered annual Screen for urine microalbumin CMP TSH free T4 and a fasting lipid.     RTC with me in 4 months this time due to checking metformin trial.         Checkout note states to return in 1 year.      Writer will send refill request to Dr. Mendoza to review and determine when follow-up is to be.      Genny Mendez RN  Endocrine Care Coordinator  [facility]

## 2023-07-29 ENCOUNTER — HEALTH MAINTENANCE LETTER (OUTPATIENT)
Age: 43
End: 2023-07-29

## 2023-10-26 ENCOUNTER — MYC MEDICAL ADVICE (OUTPATIENT)
Dept: FAMILY MEDICINE | Facility: CLINIC | Age: 43
End: 2023-10-26
Payer: COMMERCIAL

## 2023-10-26 NOTE — TELEPHONE ENCOUNTER
Patient Quality Outreach    Patient is due for the following:   Diabetes -  Eye Exam    Next Steps:   No follow up needed at this time.    Type of outreach:    Sent [employer] ([employer]) message.      Questions for provider review:    None           Belia Urena MA

## 2023-11-17 ENCOUNTER — TELEPHONE (OUTPATIENT)
Dept: ENDOCRINOLOGY | Facility: CLINIC | Age: 43
End: 2023-11-17
Payer: COMMERCIAL

## 2023-11-17 NOTE — TELEPHONE ENCOUNTER
PA Initiation    Medication: DEXCOM G6 TRANSMITTER MISC  Insurance Company: [employer] - Phone [phone] Fax [phone]  Pharmacy Filling the Rx: [facility] - [address]  Filling Pharmacy Phone: [phone]  Filling Pharmacy Fax: [phone]  Start Date: 11/17/2023    Received a fax from [employer] with PA questions for Dexcom G6 transmitter. Completed and faxed back to [employer] at [phone]. Fax confirmed sent.    Thank you,     [facility]  Pharmacy Clinic [facility]  [email or web address]   Phone: [phone]  Fax: [phone]

## 2023-11-20 NOTE — TELEPHONE ENCOUNTER
Prior Authorization Approval    Medication: DEXCOM G6 TRANSMITTER MISC  Authorization Effective Date: 11/17/2023  Authorization Expiration Date: 11/16/2024  Approved Dose/Quantity: 1 each per 90 days  Reference #: [id number]   Insurance Company: [employer] - Phone [phone] Fax [phone]  Expected CoPay: $    CoPay Card Available:      Financial Assistance Needed: No  Which Pharmacy is filling the prescription: [facility] - [address]  Pharmacy Notified: No - renewal only  Patient Notified: No - renewal only        Thank you,     [facility]  Pharmacy Clinic [facility]  [email or web address]   Phone: [phone]  Fax: [phone]

## 2023-11-28 DIAGNOSIS — E10.9 TYPE 1 DIABETES MELLITUS WITHOUT COMPLICATION (H): ICD-10-CM

## 2023-12-01 NOTE — TELEPHONE ENCOUNTER
ATORVASTATIN TABS 10MG     Last Written Prescription Date:  8/29/22  Last Fill Quantity: 90,   # refills: 3  Last Office Visit : 2/3/23  Future Office visit:  none    Routing refill request to provider for review/approval because:  Overdue LDL:  LDL Cholesterol Calculated   Date Value Ref Range Status   04/29/2022 73 <=100 mg/dL Final   04/12/2021 83 <100 mg/dL Final     Comment:     Desirable:       <100 mg/dl     Care everywhere checked for most recent labs

## 2023-12-02 RX ORDER — ATORVASTATIN CALCIUM 10 MG/1
10 TABLET, FILM COATED ORAL DAILY
Qty: 90 TABLET | Refills: 3 | Status: SHIPPED | OUTPATIENT
Start: 2023-12-02

## 2024-02-20 ENCOUNTER — TELEPHONE (OUTPATIENT)
Dept: ENDOCRINOLOGY | Facility: CLINIC | Age: 44
End: 2024-02-20
Payer: COMMERCIAL

## 2024-02-20 DIAGNOSIS — E10.9 TYPE 1 DIABETES MELLITUS WITHOUT COMPLICATION (H): ICD-10-CM

## 2024-02-23 RX ORDER — PROCHLORPERAZINE 25 MG/1
SUPPOSITORY RECTAL
Qty: 9 EACH | Refills: 0 | Status: SHIPPED | OUTPATIENT
Start: 2024-02-23 | End: 2024-02-24

## 2024-02-23 NOTE — TELEPHONE ENCOUNTER
LCV:2/3/2023  [facility] (Clinic note, RTC 1 Y)  Scheduling has been notified to contact the pt for appointment.

## 2024-02-23 NOTE — TELEPHONE ENCOUNTER
2/23 Called and spoke to patient, appointment is currently scheduled for first opening in November.     Patient is worried about time frame and labs     Can a nurse reach out?   Thanks   [facility]   Orthopedics, Podiatry, Sports Medicine, Ent ,Eye , Audiology, Adult Endocrine & Diabetes, Nutrition & Medication Therapy Management Specialties   [facility]

## 2024-02-24 ENCOUNTER — MYC REFILL (OUTPATIENT)
Dept: ENDOCRINOLOGY | Facility: CLINIC | Age: 44
End: 2024-02-24
Payer: COMMERCIAL

## 2024-02-24 ENCOUNTER — HEALTH MAINTENANCE LETTER (OUTPATIENT)
Age: 44
End: 2024-02-24

## 2024-02-24 DIAGNOSIS — E10.9 DIABETES MELLITUS TYPE 1 (H): ICD-10-CM

## 2024-02-24 DIAGNOSIS — E10.9 TYPE 1 DIABETES MELLITUS WITHOUT COMPLICATION (H): ICD-10-CM

## 2024-02-26 RX ORDER — PROCHLORPERAZINE 25 MG/1
SUPPOSITORY RECTAL
Qty: 9 EACH | Refills: 2 | Status: SHIPPED | OUTPATIENT
Start: 2024-02-26

## 2024-02-26 RX ORDER — INSULIN LISPRO 100 [IU]/ML
INJECTION, SOLUTION INTRAVENOUS; SUBCUTANEOUS
Qty: 90 ML | Refills: 1 | Status: SHIPPED | OUTPATIENT
Start: 2024-02-26 | End: 2024-08-06

## 2024-02-27 NOTE — TELEPHONE ENCOUNTER
See mychart 2/24/24 mychart encounter for further information.    Annette Orr, [facility]  Adult Endocrinology  [facility] [place]

## 2024-04-12 ENCOUNTER — OFFICE VISIT (OUTPATIENT)
Dept: ENDOCRINOLOGY | Facility: CLINIC | Age: 44
End: 2024-04-12
Payer: COMMERCIAL

## 2024-04-12 VITALS
HEART RATE: 64 BPM | WEIGHT: 203 LBS | BODY MASS INDEX: 26.42 KG/M2 | OXYGEN SATURATION: 99 % | RESPIRATION RATE: 16 BRPM | DIASTOLIC BLOOD PRESSURE: 72 MMHG | SYSTOLIC BLOOD PRESSURE: 126 MMHG

## 2024-04-12 DIAGNOSIS — E10.9 TYPE 1 DIABETES MELLITUS WITHOUT COMPLICATION (H): Primary | ICD-10-CM

## 2024-04-12 DIAGNOSIS — Z46.81 INSULIN PUMP TITRATION: ICD-10-CM

## 2024-04-12 LAB — HBA1C MFR BLD: 6.7 %

## 2024-04-12 PROCEDURE — 83036 HEMOGLOBIN GLYCOSYLATED A1C: CPT | Performed by: INTERNAL MEDICINE

## 2024-04-12 PROCEDURE — 99214 OFFICE O/P EST MOD 30 MIN: CPT | Performed by: INTERNAL MEDICINE

## 2024-04-12 NOTE — PROGRESS NOTES
- Endocrinology Follow up -    Reason for visit/consult:  DM1 on insulin pump    Primary care provider: Confirmed, No PCP    Assessment and Plan  A 44 year old male with DM1 on insulin pump 630G, no DM complication, this time A1C 6.9,     # DM1 A1C 6.7. Doing very well,   Only concern is mild hypoglycemia around 4 pm, could be due to basal dose high 2.0 unit/hour  A1c every 6 month  Average glucose 159, 42% targeted and 47 % quique      Continue Metformin  mg BID      Bolus  1:6     Basal (this will help control IQ for the limit)  0:00    1.85  3:00    1.7  6:00     1.85  8:00    2.00  11:30   1.8 (changed from 2.0)  16:30  1.8 (changed from 2.0)  22:30  1.80    # Afternoon hyperglycemia  After lunch persistently 250-300s until before dinner.   Change made above.     # DM device  Patient want sot switch to Tandem/Dexcom    #Diabetes complications  Ophthalmology updated. Mild retinopathy stable        RTC with me in 1 year        30  minutes spent on the date of the encounter doing chart review, history and exam, documentation and further activities as noted above.    Lilia Mendoza MD  Staff Physician  Endocrinology and Metabolism  [facility]  License: [place] [id number]  Pager: [phone]    Interval History as of 4/12/2024 : Patient has been doing well. A1C 6.7, contorl IQ 94%, mild hypoglycemia 4 pm  Interval History as of 2/3/2023 : Patient has been doing well. Medication compliance: excellent, counting carb. Still has pattern of hyperglycemia post lunch.Average 159.  Interval History as of 5/6/2022 : Patient has been doing well.  Medication compliance : complaint well to insulin pump including bolus . New event includes: busy with stress at work, and tend to elevated glucose recently and also lack of exercise.  Interval History as of 4/12/2021 : Patient has been doing well. Annual follow up. earler afternoon hypoglycemia occasionally, with using control IQ.  Interval  History as of 2/3/2020 : Patient has been doing well. Last seen 14 month ago.  Interval History: This is third visit. Last visit was 1 year ago. Had a baby in his family 7/2018, busy and lack of sleep. Noticed mornign spikes (240s) without breakfast, and also afternoon spike.   HPI: A 37 yo male here for the initial visit for his DM1. He moved from [place] to [place] 10/2016, now starts to establish care in [place]. He was seen at [facility], Dr. Mario, last seen in 9/2016.    He has DM1 since age 8, at that time he had polyuria, glucose was 750 during the baseball game. Insulin pump ([employer]) was started 15 years ago.    Update: he met mohit and andrey and he was started on new insulin pump 630G, which he feels satisfied. Compared to previous time, he started to feel that he bolus more properly than just keep pushing manual bolus. Again, concern and insertion site and he thinks based on the site, insulin absorption can be delayed and sometimes takes 2 hours to start to work after bolus.     He also think basal some point seems too much, but he is not sure what point. There is several episodes of glucose 70s 60s later afternoon, but looks after multiple bolus, and he does not want to change today, rather he wants to download by himself and wants to accumulate more data and want to report us.       Current regimnes:   Tandem, control IQ    Basal   0:00    1.85  3:00    1.7  6:00     1.85  8:00    2.00  11:00   2.00  16:30  2.00  22:30  1.80    Bolus  0:00 6.0   11:00 5.0  16:30 6.0    Life style:  Wake up 6am  Breakfast: skip, coffee only  Lunch: sandwich (70-90g)  No much snacks  Dinner: (70-90g carb)      DM complications:  Retinopathy: none  Nephropathy: NEGATIVE urine microalbumin 4/2022  Neuropathy: no  Most recent LDL: 73  4/2022  TSH 0.75 (4/2022)    Continues glucose monitoring: We downloaded CGM and insulin pump and summarized here.  CGM: Average glucose 159  42% targeted range, 46% high  mild    Total daily insulin : 73 units/day    Past Medical/Surgical History:  Past Medical History:   Diagnosis Date    Diabetes mellitus type 1 (H)     since age 8, medtronics pump,     high cholesterol 04/2015    Taking Lipitor generic    Mild nonproliferative diabetic retinopathy of both eyes without macular edema associated with type 1 diabetes mellitus (H)      Past Surgical History:   Procedure Laterality Date    wisdom teeth removed  1995       Allergies:  No Known Allergies    Current Medications   Current Outpatient Medications   Medication Sig Dispense Refill    atorvastatin (LIPITOR) 10 MG tablet Take 1 tablet (10 mg) by mouth daily 90 tablet 3    blood glucose (NO BRAND SPECIFIED) test strip Use to test blood sugar 4 times daily or as directed. 200 strip 3    blood glucose monitoring (NO BRAND SPECIFIED) test strip Use to test blood sugar 10 times daily or as directed. 300 strip 11    Continuous Blood Gluc  (DEXCOM G6 ) FELIX 1 Device daily Use to read blood sugars as  instructions 1 each 0    Continuous Blood Gluc Sensor (DEXCOM G6 SENSOR) MISC For additional refills, please schedule a follow-up appointment. 9 each 2    Continuous Blood Gluc Transmit (DEXCOM G6 TRANSMITTER) MISC USE 1 DEVICE EVERY 3 MONTHS, CHANGE EVERY 3 MONTHS 3 each 3    Glucose Blood (BLOOD GLUCOSE TEST STRIPS) STRP 10 times daily      insulin aspart (NOVOLOG VIAL) 100 UNITS/ML vial USE APPROXIMATELY 96 UNITS IN PUMP DAILY 90 mL 1    insulin cartridge (T:SLIM 3ML) misc pump supply Insulin cartridge to be used with pump as directed.  Change every 2-3 days or as directed. 40 each 1    Insulin Infusion Pump Supplies (AUTOSOFT XC INFUSION SET) MISC 1 each every other day Change every 2 to 3 days.  Cannula length is 9mm. Tubing length is 43 inches. 40 each 1    insulin lispro (HUMALOG) 100 UNIT/ML vial USE IN PUMP BASAL RATE PLUS MEAL COVERAGE, APPROXIMATELY 96 UNITS DAILY 90 mL 1    Lancets MISC checks 10  times daily      metFORMIN (GLUCOPHAGE XR) 500 MG 24 hr tablet Take 1 tablet (500 mg) by mouth 2 times daily (with meals) 180 tablet 3    Multiple Vitamins-Minerals (MULTIVITAMIN ADULT PO)       VITAMIN D, CHOLECALCIFEROL, PO Take by mouth as needed TAKES IN WINTER MONTHS       No current facility-administered medications for this visit.       Family History:  Family History   Problem Relation Age of Onset    No Known Problems Mother     Nephrolithiasis Father     No Known Problems Sister     Hypertension Paternal Grandmother     Diabetes Paternal Grandfather         Type 2, adult onset    Hypertension Paternal Grandfather     Hyperlipidemia Paternal Grandfather         [place]     Second cousin DM1, grandparents DM2, no thyroid issues, no celiac, no RA in family    Social History:  Social History     Tobacco Use    Smoking status: Never    Smokeless tobacco: Never   Substance Use Topics    Alcohol use: Yes     Comment: drink sparingly (3-4 servings / wk)   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      ROS:  Full review of systems taken with the help of the intake sheet. Otherwise a complete 14 point review of systems was taken and is negative unless stated in the history above.      Physical Exam:   Blood pressure 126/72, pulse 64, resp. rate 16, weight 92.1 kg (203 lb), SpO2 99%.  General: well appearing, no acute distress, pleasant and conversant,   Mental Status/neuro: alert and oriented  Face: symmetrical, normal facial color  Eyes: anicteric,, no proptosis or lid lag  Resp: normally breathing       abdominal clips

## 2024-04-12 NOTE — NURSING NOTE
Taj Bermeo's goals for this visit include:   Chief Complaint   Patient presents with    Follow Up     DM       He requests these members of his care team be copied on today's visit information: yes    PCP: Jared Ramos    Referring Provider:  No referring provider defined for this encounter.    /72 (BP Location: Left arm, Patient Position: Sitting, Cuff Size: Adult Regular)   Pulse 64   Resp 16   Wt 92.1 kg (203 lb)   SpO2 99%   BMI 26.42 kg/m      Do you need any medication refills at today's visit? None    [place], EMT

## 2024-04-12 NOTE — LETTER
4/12/2024         RE: Taj Bermeo  [address] [place] [address]        Dear Colleague,    Thank you for referring your patient, Taj Bermeo, to the [facility]. Please see a copy of my visit note below.                                             - Endocrinology Follow up -    Reason for visit/consult:  DM1 on insulin pump    Primary care provider: Confirmed, No PCP    Assessment and Plan  A 44 year old male with DM1 on insulin pump 630G, no DM complication, this time A1C 6.9,     # DM1 A1C 6.7. Doing very well,   Only concern is mild hypoglycemia around 4 pm, could be due to basal dose high 2.0 unit/hour  A1c every 6 month  Average glucose 159, 42% targeted and 47 % quique      Continue Metformin  mg BID      Bolus  1:6     Basal (this will help control IQ for the limit)  0:00    1.85  3:00    1.7  6:00     1.85  8:00    2.00  11:30   1.8 (changed from 2.0)  16:30  1.8 (changed from 2.0)  22:30  1.80    # Afternoon hyperglycemia  After lunch persistently 250-300s until before dinner.   Change made above.     # DM device  Patient want sot switch to Tandem/Dexcom    #Diabetes complications  Ophthalmology updated. Mild retinopathy stable        RTC with me in 1 year        30  minutes spent on the date of the encounter doing chart review, history and exam, documentation and further activities as noted above.    Lilia Mendoza MD  Staff Physician  Endocrinology and Metabolism  [facility]  License: [place] [id number]  Pager: [phone]    Interval History as of 4/12/2024 : Patient has been doing well. A1C 6.7, contorl IQ 94%, mild hypoglycemia 4 pm  Interval History as of 2/3/2023 : Patient has been doing well. Medication compliance: excellent, counting carb. Still has pattern of hyperglycemia post lunch.Average 159.  Interval History as of 5/6/2022 : Patient has been doing well.  Medication compliance : complaint well to insulin pump including bolus . New event  includes: busy with stress at work, and tend to elevated glucose recently and also lack of exercise.  Interval History as of 4/12/2021 : Patient has been doing well. Annual follow up. earler afternoon hypoglycemia occasionally, with using control IQ.  Interval History as of 2/3/2020 : Patient has been doing well. Last seen 14 month ago.  Interval History: This is third visit. Last visit was 1 year ago. Had a baby in his family 7/2018, busy and lack of sleep. Noticed mornign spikes (240s) without breakfast, and also afternoon spike.   HPI: A 37 yo male here for the initial visit for his DM1. He moved from [place] to [place] 10/2016, now starts to establish care in [place]. He was seen at [facility], Dr. Mario, last seen in 9/2016.    He has DM1 since age 8, at that time he had polyuria, glucose was 750 during the baseball game. Insulin pump ([employer]) was started 15 years ago.    Update: he met mohit and andrey and he was started on new insulin pump 630G, which he feels satisfied. Compared to previous time, he started to feel that he bolus more properly than just keep pushing manual bolus. Again, concern and insertion site and he thinks based on the site, insulin absorption can be delayed and sometimes takes 2 hours to start to work after bolus.     He also think basal some point seems too much, but he is not sure what point. There is several episodes of glucose 70s 60s later afternoon, but looks after multiple bolus, and he does not want to change today, rather he wants to download by himself and wants to accumulate more data and want to report us.       Current regimnes:   Tandem, control IQ    Basal   0:00    1.85  3:00    1.7  6:00     1.85  8:00    2.00  11:00   2.00  16:30  2.00  22:30  1.80    Bolus  0:00 6.0   11:00 5.0  16:30 6.0    Life style:  Wake up 6am  Breakfast: skip, coffee only  Lunch: sandwich (70-90g)  No much snacks  Dinner: (70-90g carb)      DM complications:  Retinopathy:  none  Nephropathy: NEGATIVE urine microalbumin 4/2022  Neuropathy: no  Most recent LDL: 73  4/2022  TSH 0.75 (4/2022)    Continues glucose monitoring: We downloaded CGM and insulin pump and summarized here.  CGM: Average glucose 159  42% targeted range, 46% high mild    Total daily insulin : 73 units/day    Past Medical/Surgical History:  Past Medical History:   Diagnosis Date     Diabetes mellitus type 1 (H)     since age 8, medtronics pump,      high cholesterol 04/2015    Taking Lipitor generic     Mild nonproliferative diabetic retinopathy of both eyes without macular edema associated with type 1 diabetes mellitus (H)      Past Surgical History:   Procedure Laterality Date     wisdom teeth removed  1995       Allergies:  No Known Allergies    Current Medications   Current Outpatient Medications   Medication Sig Dispense Refill     atorvastatin (LIPITOR) 10 MG tablet Take 1 tablet (10 mg) by mouth daily 90 tablet 3     blood glucose (NO BRAND SPECIFIED) test strip Use to test blood sugar 4 times daily or as directed. 200 strip 3     blood glucose monitoring (NO BRAND SPECIFIED) test strip Use to test blood sugar 10 times daily or as directed. 300 strip 11     Continuous Blood Gluc  (DEXCOM G6 ) FELIX 1 Device daily Use to read blood sugars as  instructions 1 each 0     Continuous Blood Gluc Sensor (DEXCOM G6 SENSOR) MISC For additional refills, please schedule a follow-up appointment. 9 each 2     Continuous Blood Gluc Transmit (DEXCOM G6 TRANSMITTER) MISC USE 1 DEVICE EVERY 3 MONTHS, CHANGE EVERY 3 MONTHS 3 each 3     Glucose Blood (BLOOD GLUCOSE TEST STRIPS) STRP 10 times daily       insulin aspart (NOVOLOG VIAL) 100 UNITS/ML vial USE APPROXIMATELY 96 UNITS IN PUMP DAILY 90 mL 1     insulin cartridge (T:SLIM 3ML) misc pump supply Insulin cartridge to be used with pump as directed.  Change every 2-3 days or as directed. 40 each 1     Insulin Infusion Pump Supplies (AUTOSOFT XC INFUSION  SET) MISC 1 each every other day Change every 2 to 3 days.  Cannula length is 9mm. Tubing length is 43 inches. 40 each 1     insulin lispro (HUMALOG) 100 UNIT/ML vial USE IN PUMP BASAL RATE PLUS MEAL COVERAGE, APPROXIMATELY 96 UNITS DAILY 90 mL 1     Lancets MISC checks 10 times daily       metFORMIN (GLUCOPHAGE XR) 500 MG 24 hr tablet Take 1 tablet (500 mg) by mouth 2 times daily (with meals) 180 tablet 3     Multiple Vitamins-Minerals (MULTIVITAMIN ADULT PO)        VITAMIN D, CHOLECALCIFEROL, PO Take by mouth as needed TAKES IN WINTER MONTHS       No current facility-administered medications for this visit.       Family History:  Family History   Problem Relation Age of Onset     No Known Problems Mother      Nephrolithiasis Father      No Known Problems Sister      Hypertension Paternal Grandmother      Diabetes Paternal Grandfather         Type 2, adult onset     Hypertension Paternal Grandfather      Hyperlipidemia Paternal Grandfather         [place]     Second cousin DM1, grandparents DM2, no thyroid issues, no celiac, no RA in family    Social History:  Social History     Tobacco Use     Smoking status: Never     Smokeless tobacco: Never   Substance Use Topics     Alcohol use: Yes     Comment: drink sparingly (3-4 servings / wk)   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      ROS:  Full review of systems taken with the help of the intake sheet. Otherwise a complete 14 point review of systems was taken and is negative unless stated in the history above.      Physical Exam:   Blood pressure 126/72, pulse 64, resp. rate 16, weight 92.1 kg (203 lb), SpO2 99%.  General: well appearing, no acute distress, pleasant and conversant,   Mental Status/neuro: alert and oriented  Face: symmetrical, normal facial color  Eyes: anicteric,, no proptosis or lid lag  Resp: normally breathing        Again, thank you for allowing me to participate in the care of your patient.         Sincerely,        Lilia Mendoza MD

## 2024-04-12 NOTE — PATIENT INSTRUCTIONS
Welcome to the [facility] Diabetes [facility]ics     Our Endocrinology Clinics are here to provide you with a team-based, collaborative approach in the diagnosis and treatment of patients with diabetes and endocrine disorders. The team is made up of Physicians, Physician Assistants, Certified Diabetes Educators, Registered Nurses, Medical Assistants, Emergency Medical Technicians, and many others, all of whom have the unified goal of providing our patients with high quality care.     Please see below for some helpful tips to best navigate and use the [facility] Endocrinology clinic:     Hiko Respect: At [facility], we are committed to a respectful and safe space for all patients, visitors, and staff.  We believe that mutual respect between patients and their care team is the foundation of quality care.  It is our expectation that you will be treated with respect by your care team.  In turn, we ask that all communication with the care team (written and verbal) be respectful and free from profanity, threatening, or abusive language.  Disrespectful communication undermines our therapeutic relationship with you and may result in us being unable to continue to provide your care.    Refills: A provider must see you at least annually to prescribe and refill medications. This is to ensure your safety as well as meet insurance and compliance regulations.    Scheduling: Many of our Providers offer both in-person or video visits. Please call to schedule any needed follow ups as soon as possible because our provider schedules fill up very quickly. Our care team has the right to require an in-person visit when they believe that it is medically necessary. Please remember that for any virtual visits, you must be in the [facility] at the time of the visit, otherwise we are unable to see you and you will need to be rescheduled.    Missed Appointments: If you need to cancel or miss your  scheduled appointment, please call the clinic at [phone] to reschedule.  Please note if you repeatedly miss appointments or repeatedly miss appointments without calling to inform us ahead of time (no-show), the clinic may elect to not allow you to reschedule without speaking to a manager, may require a Partnership In Care Agreement prior to rescheduling, or could result in you no longer being able to receive care from the clinic. Providing the clinic with timely notification if you have to miss an appointment, allows us to better serve the needs of all of our patients.    Primary Care Provider: Our Endocrinologists are Specialists in their field. We expect you to have a Primary Care Provider established to handle any needs outside of your diabetes and endocrine care.  We would be happy to assist you find a Primary Care Provider, if you do not have one.    [employer]: [employer] is a wonderful resource that allows you access to your Care Team via online or the casey. Please ask a member of the team if you would like help creating an account. Please note that it may take up to 2 business days for a response. [employer] messages are not reviewed on weekends or after business hours.  Emergent or urgent care needs should never be communicated via [employer].  If you experience a medical emergency call 911 or go to the nearest emergency room.    Labs: It is recommended that you stay within the [facility] System for labs but you are welcome to obtain ordered labs (with some exceptions) from any location of your choice as long as they are able to complete and process the needed labs. If you need us to fax orders to your preferred lab, please provide us the name and fax number of the lab you would like to go to so we can fax the orders. If your labs are drawn outside of the [facility], please have them fax the results to [phone] ([place]) or [phone] ([place]) or via [facility]. It is your  responsibility to ensure that outside lab results are sent to us.    We look forward to working with you. Please do not hesitate to reach out with any questions.    Thank you,    The Endocrine Team    [facility] Address:   [place] Address:     [address] [place] [address]    Phone: [phone]  Fax: [phone] [address] [place] [address]    Phone: [phone]  Fax: [phone]     [facility] Cost Estimate Phone Number: [phone]    General Lab and Imaging Scheduling Phone Number: [phone]

## 2024-06-01 DIAGNOSIS — E10.9 TYPE 1 DIABETES MELLITUS WITHOUT COMPLICATION (H): ICD-10-CM

## 2024-06-07 RX ORDER — METFORMIN HCL 500 MG
500 TABLET, EXTENDED RELEASE 24 HR ORAL 2 TIMES DAILY WITH MEALS
Qty: 180 TABLET | Refills: 3 | Status: SHIPPED | OUTPATIENT
Start: 2024-06-07

## 2024-06-07 RX ORDER — PROCHLORPERAZINE 25 MG/1
SUPPOSITORY RECTAL
Qty: 1 EACH | Refills: 3 | Status: SHIPPED | OUTPATIENT
Start: 2024-06-07

## 2024-06-07 NOTE — TELEPHONE ENCOUNTER
LVD:  4/12/2024  [facility]     Lilia Mendoza MD  Endocrinology, Diabetes, and Metabolism   RTC one year  Refilled per protocol.

## 2024-07-13 ENCOUNTER — HEALTH MAINTENANCE LETTER (OUTPATIENT)
Age: 44
End: 2024-07-13

## 2024-08-06 DIAGNOSIS — E10.9 DIABETES MELLITUS TYPE 1 (H): ICD-10-CM

## 2024-08-06 RX ORDER — INSULIN LISPRO 100 [IU]/ML
INJECTION, SOLUTION INTRAVENOUS; SUBCUTANEOUS
Qty: 90 ML | Refills: 3 | Status: SHIPPED | OUTPATIENT
Start: 2024-08-06

## 2024-08-06 NOTE — TELEPHONE ENCOUNTER
INSULIN LISPRO(BRAND)[id number] 100U/ML      Last Written Prescription Date:  2/26/24  Last Fill Quantity: 90 ml ,   # refills: 1  Last Office Visit : 4/12/24  Future Office visit:  11/22/24 Kelly TREVIÑO Endo    Routing refill request to provider for review/approval because:  Insulin and insulin pump supplies - refilled per Endocrine clinic.

## 2024-10-21 ENCOUNTER — DOCUMENTATION ONLY (OUTPATIENT)
Dept: ENDOCRINOLOGY | Facility: CLINIC | Age: 44
End: 2024-10-21
Payer: COMMERCIAL

## 2024-10-21 NOTE — PROGRESS NOTES
Type of form: SMN & Prescription plus last 2 clinical visit notes  From:  [employer]   Fax: [phone]  Supplies requested on form:  Diabetic insulin pump and supplies  Provider: Lilia Mendoza  Date provider will be in clinic to sign:  10/25/2024  Labeled form completed to the best of writers ability.  Printed clinical progress notes from: 4/12/2024 & 2/03/2023      Placed in provider's file for review when in clinic    Edita Barrett CMA  Adult Endocrinology   [facility]

## 2024-10-25 ENCOUNTER — MEDICAL CORRESPONDENCE (OUTPATIENT)
Dept: HEALTH INFORMATION MANAGEMENT | Facility: CLINIC | Age: 44
End: 2024-10-25

## 2024-10-25 NOTE — PROGRESS NOTES
Provider reviewed and signed   Faxed back to: [phone]  See image below for Timestamp confirmation of successful transmission.       Placed in [employer] file       Edita Barrett CMA  Adult Endocrinology   [facility]

## 2024-11-01 ENCOUNTER — TELEPHONE (OUTPATIENT)
Dept: ENDOCRINOLOGY | Facility: CLINIC | Age: 44
End: 2024-11-01

## 2024-11-01 NOTE — TELEPHONE ENCOUNTER
PA RENEWAL Initiation    Medication: DEXCOM G6 SENSOR MISC  Insurance Company: [email or web address] - Phone [phone] Fax [phone]  Pharmacy Filling the Rx:    Filling Pharmacy Phone:    Filling Pharmacy Fax:    Start Date: 11/1/2024    [id number]

## 2024-11-04 NOTE — TELEPHONE ENCOUNTER
Prior Authorization Approval    Medication: DEXCOM G6 SENSOR MISC  Authorization Effective Date: 11/1/2024  Authorization Expiration Date: 11/1/2025  Approved Dose/Quantity: 3 per 30 days  Reference #: [id number]   Insurance Company: [employer] [phone] Fax [phone]  Expected CoPay: $    CoPay Card Available:      Financial Assistance Needed:   Which Pharmacy is filling the prescription:    Pharmacy Notified:   Patient Notified:

## 2025-01-24 DIAGNOSIS — E10.9 TYPE 1 DIABETES MELLITUS WITHOUT COMPLICATION (H): ICD-10-CM

## 2025-01-26 RX ORDER — ATORVASTATIN CALCIUM 10 MG/1
10 TABLET, FILM COATED ORAL DAILY
Qty: 90 TABLET | Refills: 3 | Status: SHIPPED | OUTPATIENT
Start: 2025-01-26

## 2025-01-27 NOTE — TELEPHONE ENCOUNTER
LVD:  LVD Dr Mendoza/ Endo 11/22/2024  [facility]  Last written as: atorvastatin (LIPITOR) 10 MG [id number]/[id number]   Sig - Route: Take 1 tablet (10 mg) by mouth daily - Oral  Medication: LIPITOR  Refill decision: Medication refilled per  Medication Refill in Ambulatory Care  policy.    LDL Cholesterol Calculated   Date Value Ref Range Status   11/22/2024 78 <100 mg/dL Final   04/12/2021 83 <100 mg/dL Final     Comment:     Desirable:       <100 mg/dl

## 2025-06-07 ENCOUNTER — HEALTH MAINTENANCE LETTER (OUTPATIENT)
Age: 45
End: 2025-06-07

## 2025-06-20 ENCOUNTER — DOCUMENTATION ONLY (OUTPATIENT)
Dept: ENDOCRINOLOGY | Facility: CLINIC | Age: 45
End: 2025-06-20

## 2025-06-23 ENCOUNTER — LAB (OUTPATIENT)
Dept: LAB | Facility: CLINIC | Age: 45
End: 2025-06-23
Payer: COMMERCIAL

## 2025-06-23 DIAGNOSIS — E10.9 TYPE 1 DIABETES MELLITUS WITHOUT COMPLICATION (H): ICD-10-CM

## 2025-06-23 LAB
ALBUMIN SERPL BCG-MCNC: 4.6 G/DL (ref 3.5–5.2)
ALP SERPL-CCNC: 67 U/L (ref 40–150)
ALT SERPL W P-5'-P-CCNC: 22 U/L (ref 0–70)
ANION GAP SERPL CALCULATED.3IONS-SCNC: 11 MMOL/L (ref 7–15)
AST SERPL W P-5'-P-CCNC: 19 U/L (ref 0–45)
BILIRUB SERPL-MCNC: 0.6 MG/DL
BUN SERPL-MCNC: 10.2 MG/DL (ref 6–20)
CALCIUM SERPL-MCNC: 9.9 MG/DL (ref 8.8–10.4)
CHLORIDE SERPL-SCNC: 103 MMOL/L (ref 98–107)
CHOLEST SERPL-MCNC: 140 MG/DL
CREAT SERPL-MCNC: 0.86 MG/DL (ref 0.67–1.17)
CREAT UR-MCNC: 63.8 MG/DL
EGFRCR SERPLBLD CKD-EPI 2021: >90 ML/MIN/1.73M2
EST. AVERAGE GLUCOSE BLD GHB EST-MCNC: 134 MG/DL
FASTING STATUS PATIENT QL REPORTED: YES
FASTING STATUS PATIENT QL REPORTED: YES
GLUCOSE SERPL-MCNC: 83 MG/DL (ref 70–99)
HBA1C MFR BLD: 6.3 %
HCO3 SERPL-SCNC: 25 MMOL/L (ref 22–29)
HDLC SERPL-MCNC: 60 MG/DL
LDLC SERPL CALC-MCNC: 71 MG/DL
MICROALBUMIN UR-MCNC: <12 MG/L
MICROALBUMIN/CREAT UR: NORMAL MG/G{CREAT}
NONHDLC SERPL-MCNC: 80 MG/DL
POTASSIUM SERPL-SCNC: 4 MMOL/L (ref 3.4–5.3)
PROT SERPL-MCNC: 7.1 G/DL (ref 6.4–8.3)
SODIUM SERPL-SCNC: 139 MMOL/L (ref 135–145)
TRIGL SERPL-MCNC: 46 MG/DL

## 2025-06-23 PROCEDURE — 80053 COMPREHEN METABOLIC PANEL: CPT | Performed by: PATHOLOGY

## 2025-06-23 PROCEDURE — 36415 COLL VENOUS BLD VENIPUNCTURE: CPT | Performed by: PATHOLOGY

## 2025-06-23 PROCEDURE — 82570 ASSAY OF URINE CREATININE: CPT | Performed by: INTERNAL MEDICINE

## 2025-06-23 PROCEDURE — 83036 HEMOGLOBIN GLYCOSYLATED A1C: CPT | Performed by: INTERNAL MEDICINE

## 2025-06-23 PROCEDURE — 80061 LIPID PANEL: CPT | Performed by: PATHOLOGY

## 2025-06-23 PROCEDURE — 99000 SPECIMEN HANDLING OFFICE-LAB: CPT | Performed by: PATHOLOGY

## 2025-06-24 ENCOUNTER — RESULTS FOLLOW-UP (OUTPATIENT)
Dept: ENDOCRINOLOGY | Facility: CLINIC | Age: 45
End: 2025-06-24

## 2025-07-09 ENCOUNTER — MYC MEDICAL ADVICE (OUTPATIENT)
Dept: ENDOCRINOLOGY | Facility: CLINIC | Age: 45
End: 2025-07-09
Payer: COMMERCIAL

## 2025-07-09 DIAGNOSIS — E10.9 TYPE 1 DIABETES MELLITUS WITHOUT COMPLICATION (H): Primary | ICD-10-CM

## 2025-07-19 ENCOUNTER — HEALTH MAINTENANCE LETTER (OUTPATIENT)
Age: 45
End: 2025-07-19

## 2025-08-05 ENCOUNTER — MYC MEDICAL ADVICE (OUTPATIENT)
Dept: ENDOCRINOLOGY | Facility: CLINIC | Age: 45
End: 2025-08-05
Payer: COMMERCIAL

## 2025-08-05 DIAGNOSIS — Z82.41 FAMILY HISTORY OF SUDDEN CARDIAC DEATH: Primary | ICD-10-CM

## 2025-08-25 ENCOUNTER — VIRTUAL VISIT (OUTPATIENT)
Dept: CARDIOLOGY | Facility: CLINIC | Age: 45
End: 2025-08-25
Attending: INTERNAL MEDICINE
Payer: COMMERCIAL

## 2025-08-25 ENCOUNTER — PRE VISIT (OUTPATIENT)
Dept: CARDIOLOGY | Facility: CLINIC | Age: 45
End: 2025-08-25
Payer: COMMERCIAL

## 2025-08-25 VITALS — BODY MASS INDEX: 26.51 KG/M2 | WEIGHT: 200 LBS | HEIGHT: 73 IN

## 2025-08-25 DIAGNOSIS — Z82.41 FAMILY HISTORY OF SUDDEN CARDIAC DEATH: ICD-10-CM

## 2025-08-25 ASSESSMENT — PAIN SCALES - GENERAL: PAINLEVEL_OUTOF10: NO PAIN (0)

## 2025-08-27 ENCOUNTER — HOSPITAL ENCOUNTER (OUTPATIENT)
Dept: CARDIOLOGY | Facility: CLINIC | Age: 45
Discharge: HOME | End: 2025-08-27
Attending: INTERNAL MEDICINE
Payer: COMMERCIAL

## 2025-08-27 DIAGNOSIS — Z82.41 FAMILY HISTORY OF SUDDEN CARDIAC DEATH: ICD-10-CM

## 2025-08-27 LAB
CV STRESS CURRENT BP HE: NORMAL
CV STRESS CURRENT HR HE: 100
CV STRESS CURRENT HR HE: 105
CV STRESS CURRENT HR HE: 105
CV STRESS CURRENT HR HE: 109
CV STRESS CURRENT HR HE: 110
CV STRESS CURRENT HR HE: 110
CV STRESS CURRENT HR HE: 112
CV STRESS CURRENT HR HE: 112
CV STRESS CURRENT HR HE: 113
CV STRESS CURRENT HR HE: 119
CV STRESS CURRENT HR HE: 120
CV STRESS CURRENT HR HE: 121
CV STRESS CURRENT HR HE: 122
CV STRESS CURRENT HR HE: 123
CV STRESS CURRENT HR HE: 125
CV STRESS CURRENT HR HE: 127
CV STRESS CURRENT HR HE: 135
CV STRESS CURRENT HR HE: 136
CV STRESS CURRENT HR HE: 140
CV STRESS CURRENT HR HE: 145
CV STRESS CURRENT HR HE: 70
CV STRESS CURRENT HR HE: 80
CV STRESS CURRENT HR HE: 80
CV STRESS CURRENT HR HE: 81
CV STRESS CURRENT HR HE: 83
CV STRESS CURRENT HR HE: 84
CV STRESS CURRENT HR HE: 84
CV STRESS CURRENT HR HE: 85
CV STRESS CURRENT HR HE: 88
CV STRESS CURRENT HR HE: 97
CV STRESS DEVIATION TIME HE: NORMAL
CV STRESS ECHO PERCENT HR HE: NORMAL
CV STRESS EXERCISE STAGE HE: NORMAL
CV STRESS EXERCISE STAGE REACHED HE: NORMAL
CV STRESS FINAL RESTING BP HE: NORMAL
CV STRESS FINAL RESTING HR HE: 81
CV STRESS MAX HR HE: 144
CV STRESS MAX TREADMILL GRADE HE: NORMAL
CV STRESS MAX TREADMILL SPEED HE: NORMAL
CV STRESS PEAK DIA BP HE: NORMAL
CV STRESS PEAK SYS BP HE: NORMAL
CV STRESS PHASE HE: NORMAL
CV STRESS PROTOCOL HE: NORMAL
CV STRESS REASON STOPPED HE: NORMAL
CV STRESS RESTING PT POSITION HE: NORMAL
CV STRESS RESTING PT POSITION HE: NORMAL
CV STRESS ST DEVIATION AMOUNT HE: NORMAL
CV STRESS ST DEVIATION ELEVATION HE: NORMAL
CV STRESS ST EVELATION AMOUNT HE: NORMAL
CV STRESS SYMPTOMS HE: NORMAL
CV STRESS TEST TYPE HE: NORMAL
CV STRESS TOTAL STAGE TIME MIN 1 HE: NORMAL
STRESS ECHO BASELINE DIASTOLIC HE: NORMAL
STRESS ECHO BASELINE HR: 70
STRESS ECHO BASELINE SYSTOLIC BP: NORMAL
STRESS ECHO LAST STRESS DIASTOLIC BP: 87
STRESS ECHO LAST STRESS HR: 145
STRESS ECHO LAST STRESS SYSTOLIC BP: 239
STRESS ECHO POST ESTIMATED WORKLOAD: 9.9
STRESS ECHO POST EXERCISE DUR MIN: 11
STRESS ECHO POST EXERCISE DUR SEC: 33
STRESS ECHO TARGET HR: 150

## 2025-08-27 PROCEDURE — 255N000002 HC RX 255 OP 636: Performed by: INTERNAL MEDICINE

## 2025-08-27 PROCEDURE — 93325 DOPPLER ECHO COLOR FLOW MAPG: CPT | Mod: TC

## 2025-08-27 RX ADMIN — PERFLUTREN 5 ML (DILUTED): 6.52 INJECTION, SUSPENSION INTRAVENOUS at 14:12
